# Patient Record
Sex: FEMALE | Race: WHITE | NOT HISPANIC OR LATINO | Employment: FULL TIME | URBAN - METROPOLITAN AREA
[De-identification: names, ages, dates, MRNs, and addresses within clinical notes are randomized per-mention and may not be internally consistent; named-entity substitution may affect disease eponyms.]

---

## 2023-12-14 DIAGNOSIS — C18.9 MALIGNANT NEOPLASM OF COLON, UNSPECIFIED PART OF COLON (MULTI): ICD-10-CM

## 2023-12-21 ENCOUNTER — LAB (OUTPATIENT)
Dept: LAB | Facility: HOSPITAL | Age: 53
End: 2023-12-21
Payer: COMMERCIAL

## 2023-12-21 DIAGNOSIS — C18.9 MALIGNANT NEOPLASM OF COLON, UNSPECIFIED PART OF COLON (MULTI): ICD-10-CM

## 2023-12-21 DIAGNOSIS — C18.7 MALIGNANT NEOPLASM OF SIGMOID COLON (MULTI): ICD-10-CM

## 2023-12-21 LAB — CEA SERPL-MCNC: <0.5 UG/L

## 2023-12-21 PROCEDURE — 36415 COLL VENOUS BLD VENIPUNCTURE: CPT

## 2023-12-21 PROCEDURE — 36416 COLLJ CAPILLARY BLOOD SPEC: CPT

## 2023-12-21 PROCEDURE — 82378 CARCINOEMBRYONIC ANTIGEN: CPT

## 2023-12-29 LAB — SCAN RESULT: NORMAL

## 2024-01-18 ENCOUNTER — OFFICE VISIT (OUTPATIENT)
Dept: GASTROENTEROLOGY | Facility: CLINIC | Age: 54
End: 2024-01-18
Payer: COMMERCIAL

## 2024-01-18 VITALS
HEIGHT: 68 IN | DIASTOLIC BLOOD PRESSURE: 57 MMHG | SYSTOLIC BLOOD PRESSURE: 87 MMHG | HEART RATE: 73 BPM | WEIGHT: 293 LBS | BODY MASS INDEX: 44.41 KG/M2

## 2024-01-18 DIAGNOSIS — C18.7 CANCER OF SIGMOID COLON (MULTI): Primary | ICD-10-CM

## 2024-01-18 DIAGNOSIS — K76.0 HEPATIC STEATOSIS: ICD-10-CM

## 2024-01-18 PROCEDURE — 99213 OFFICE O/P EST LOW 20 MIN: CPT | Performed by: INTERNAL MEDICINE

## 2024-01-18 RX ORDER — POLYETHYLENE GLYCOL 3350, SODIUM SULFATE ANHYDROUS, SODIUM BICARBONATE, SODIUM CHLORIDE, POTASSIUM CHLORIDE 236; 22.74; 6.74; 5.86; 2.97 G/4L; G/4L; G/4L; G/4L; G/4L
4000 POWDER, FOR SOLUTION ORAL ONCE
Qty: 4000 ML | Refills: 0 | Status: SHIPPED | OUTPATIENT
Start: 2024-01-18 | End: 2024-01-18

## 2024-01-18 RX ORDER — MULTIVITAMIN WITH IRON
1 TABLET ORAL DAILY
COMMUNITY
Start: 2021-01-21

## 2024-01-18 RX ORDER — CHOLECALCIFEROL (VITAMIN D3) 125 MCG
1 CAPSULE ORAL DAILY
COMMUNITY
Start: 2021-01-21 | End: 2024-04-25 | Stop reason: ALTCHOICE

## 2024-01-18 RX ORDER — ONDANSETRON 4 MG/1
4 TABLET, FILM COATED ORAL EVERY 8 HOURS PRN
Qty: 9 TABLET | Refills: 0 | Status: SHIPPED | OUTPATIENT
Start: 2024-01-18 | End: 2024-04-25 | Stop reason: ALTCHOICE

## 2024-01-18 NOTE — PROGRESS NOTES
Chief Complaint: Latasha Rodrigues is a 53 y.o. female who presents for Colon Cancer Screening (Needs every 2 years and is due).  HPI  This is a 53-year-old female,  new employee, history of hypertension, S/P cholecystectomy, morbid obesity, hepatic steatosis, obesity, sigmoid cancer diagnosed on 10/5/2020 S/P sigmoid resection with end-to-end anastomosis, came to GI clinic for establish care.  Colonoscopy by Dr. Chas Oliveira on 10/5/2020-found sigmoid mass at 25 cm from anal verge.  Follow-up colonoscopy via ostomy by Dr. Nguyen on 2/8/2021-5 mm cecal polyp removed  Also had flexible sigmoidoscopy by Dr. Nguyen on 4/5/2021.  Another colonoscopy by Dr. Dee  on 12/7/2021: 4 subcentimeter polyps removed, grade 1 internal hemorrhoids, patent end-to-end colocolonic anastomosis.  Path report on colon polyp-tubular adenoma.  Currently no acute GI symptoms.  CT scan on 7/17/2023: Hepatic steatosis.  No evidence of metastasis  Review of Systems   Gastrointestinal: Negative.      12 Point ROS negative outside of symptoms stated above in HPI    Past Medical History:   Diagnosis Date    Colon cancer (CMS/HCC) Oct 2020    Colon polyp Oct 2020    Diseases of lips 12/28/2015    Cheilitis    Hypertension     Morbid (severe) obesity due to excess calories (CMS/HCC) 09/09/2019    Class 3 severe obesity due to excess calories with serious comorbidity and body mass index (BMI) of 45.0 to 49.9 in adult    Personal history of other diseases of the digestive system 05/13/2015    History of cholecystitis    Personal history of other diseases of the female genital tract 04/16/2015    History of ovarian cyst    Personal history of other specified conditions 10/16/2020    History of abdominal pain    Portal vein thrombosis 12/01/2021    Portal vein thrombosis    Right upper quadrant pain 04/16/2015    Abdominal pain, RUQ (right upper quadrant)    Unspecified open wound of abdominal wall, unspecified quadrant without penetration into peritoneal  "cavity, initial encounter 12/01/2021    Open abdominal wall wound       Past Surgical History:   Procedure Laterality Date    CHOLECYSTECTOMY  2015    COLON SURGERY  Oct 2020    COLOSTOMY  Oct 2020    OTHER SURGICAL HISTORY  06/01/2022    Cholecystectomy laparoscopic    OTHER SURGICAL HISTORY  10/07/2020    Sigmoidectomy    OTHER SURGICAL HISTORY  05/13/2015    Salpingo-oophorectomy Right Side    US GUIDED ABSCESS DRAIN  10/28/2020    US GUIDED ABSCESS DRAIN 10/28/2020 [facility]       No relevant family history has been documented for this patient.     reports that she has quit smoking. She has never used smokeless tobacco. She reports current alcohol use of about 5.0 standard drinks of alcohol per week. She reports that she does not use drugs.    No Known Allergies    Imaging  No results found.      Laboratory  No results found for this or any previous visit (from the past 96 hour(s)).     Objective       Current Outpatient Medications:     cholecalciferol (Vitamin D-3) 125 MCG (5000 UT) capsule, Take 1 tablet by mouth once daily., Disp: , Rfl:     multivitamin (Multiple Vitamins) tablet, Take 1 tablet by mouth once daily., Disp: , Rfl:     lisinopril 10 mg tablet 10 mg, hydroCHLOROthiazide 12.5 mg tablet 12.5 mg, Take 1 tablet by mouth once daily., Disp: , Rfl:     Last Recorded Vitals  Blood pressure 87/57, pulse 73, height 1.727 m (5' 8\"), weight 144 kg (318 lb).    Physical Exam  HENT:      Mouth/Throat:      Mouth: Mucous membranes are moist.   Cardiovascular:      Rate and Rhythm: Normal rate and regular rhythm.   Pulmonary:      Effort: Pulmonary effort is normal.      Breath sounds: Normal breath sounds.   Abdominal:      General: Bowel sounds are normal.      Palpations: Abdomen is soft.      Tenderness: There is no abdominal tenderness.      Comments: Abdominal obesity, limited exam   Musculoskeletal:      Right lower leg: Edema present.      Left lower leg: Edema present.      Comments: Nonpitting "   Neurological:      Mental Status: She is alert and oriented to person, place, and time.         Assessment/Plan    Stage IIb sigmoid cancer S/P resection in 2020.  Last colonoscopy in 2021.  Tubular adenoma  Hepatic steatosis      Surveillance colonoscopy.  FibroScan.  Monitor LFTs  Hepatitis B surface antigen, hepatitis C, hepatitis A total antibody, hepatitis B surface antibody if not checked in the past.  Avoid alcohol.  Dietary and lifestyle modification.  May discuss with bariatric team.  Follow-up with me in 6 months.

## 2024-01-18 NOTE — PATIENT INSTRUCTIONS
Surveillance colonoscopy.  FibroScan.  Monitor LFTs  Hepatitis B surface antigen, hepatitis C, hepatitis A total antibody, hepatitis B surface antibody if not checked in the past.  Avoid alcohol.  Dietary and lifestyle modification.  May discuss with bariatric team.  Follow-up with me in 6 months.

## 2024-01-28 ASSESSMENT — ENCOUNTER SYMPTOMS: GASTROINTESTINAL NEGATIVE: 1

## 2024-02-03 DIAGNOSIS — I10 ESSENTIAL (PRIMARY) HYPERTENSION: ICD-10-CM

## 2024-02-03 RX ORDER — LISINOPRIL AND HYDROCHLOROTHIAZIDE 10; 12.5 MG/1; MG/1
1 TABLET ORAL DAILY
Qty: 90 TABLET | Refills: 0 | Status: SHIPPED | OUTPATIENT
Start: 2024-02-03 | End: 2024-03-20 | Stop reason: ALTCHOICE

## 2024-02-09 ENCOUNTER — CLINICAL SUPPORT (OUTPATIENT)
Dept: GASTROENTEROLOGY | Facility: CLINIC | Age: 54
End: 2024-02-09
Payer: COMMERCIAL

## 2024-02-09 DIAGNOSIS — K76.0 HEPATIC STEATOSIS: ICD-10-CM

## 2024-02-09 PROCEDURE — 91200 LIVER ELASTOGRAPHY: CPT | Performed by: INTERNAL MEDICINE

## 2024-02-27 ENCOUNTER — APPOINTMENT (OUTPATIENT)
Dept: PRIMARY CARE | Facility: CLINIC | Age: 54
End: 2024-02-27
Payer: COMMERCIAL

## 2024-03-04 DIAGNOSIS — C18.7 MALIGNANT NEOPLASM OF SIGMOID COLON (MULTI): ICD-10-CM

## 2024-03-15 ENCOUNTER — LAB (OUTPATIENT)
Dept: LAB | Facility: HOSPITAL | Age: 54
End: 2024-03-15
Payer: COMMERCIAL

## 2024-03-15 DIAGNOSIS — C18.7 MALIGNANT NEOPLASM OF SIGMOID COLON (MULTI): ICD-10-CM

## 2024-03-15 PROCEDURE — 36415 COLL VENOUS BLD VENIPUNCTURE: CPT

## 2024-03-19 ENCOUNTER — ANESTHESIA EVENT (OUTPATIENT)
Dept: OPERATING ROOM | Facility: HOSPITAL | Age: 54
End: 2024-03-19
Payer: COMMERCIAL

## 2024-03-19 RX ORDER — ONDANSETRON HYDROCHLORIDE 2 MG/ML
8 INJECTION, SOLUTION INTRAVENOUS ONCE
Status: CANCELLED | OUTPATIENT
Start: 2024-03-19 | End: 2024-03-19

## 2024-03-19 RX ORDER — ACETAMINOPHEN 325 MG/1
650 TABLET ORAL EVERY 4 HOURS PRN
Status: CANCELLED | OUTPATIENT
Start: 2024-03-19

## 2024-03-19 RX ORDER — ALBUTEROL SULFATE 0.83 MG/ML
2.5 SOLUTION RESPIRATORY (INHALATION) ONCE AS NEEDED
Status: CANCELLED | OUTPATIENT
Start: 2024-03-19

## 2024-03-20 ENCOUNTER — ANESTHESIA (OUTPATIENT)
Dept: OPERATING ROOM | Facility: HOSPITAL | Age: 54
End: 2024-03-20
Payer: COMMERCIAL

## 2024-03-20 ENCOUNTER — HOSPITAL ENCOUNTER (OUTPATIENT)
Dept: OPERATING ROOM | Facility: HOSPITAL | Age: 54
Setting detail: OUTPATIENT SURGERY
Discharge: HOME | End: 2024-03-20
Payer: COMMERCIAL

## 2024-03-20 VITALS
RESPIRATION RATE: 16 BRPM | OXYGEN SATURATION: 97 % | WEIGHT: 293 LBS | HEART RATE: 72 BPM | DIASTOLIC BLOOD PRESSURE: 67 MMHG | HEIGHT: 68 IN | SYSTOLIC BLOOD PRESSURE: 134 MMHG | TEMPERATURE: 97.9 F | BODY MASS INDEX: 44.41 KG/M2

## 2024-03-20 DIAGNOSIS — K76.0 HEPATIC STEATOSIS: ICD-10-CM

## 2024-03-20 DIAGNOSIS — C18.7 CANCER OF SIGMOID COLON (MULTI): ICD-10-CM

## 2024-03-20 PROBLEM — I10 HTN (HYPERTENSION): Status: ACTIVE | Noted: 2024-03-20

## 2024-03-20 PROBLEM — E66.9 OBESITY: Status: ACTIVE | Noted: 2024-03-20

## 2024-03-20 LAB — PREGNANCY TEST URINE, POC: NEGATIVE

## 2024-03-20 PROCEDURE — 2500000004 HC RX 250 GENERAL PHARMACY W/ HCPCS (ALT 636 FOR OP/ED): Performed by: ANESTHESIOLOGY

## 2024-03-20 PROCEDURE — 88305 TISSUE EXAM BY PATHOLOGIST: CPT | Mod: TC,GEALAB | Performed by: INTERNAL MEDICINE

## 2024-03-20 PROCEDURE — 3700000001 HC GENERAL ANESTHESIA TIME - INITIAL BASE CHARGE: Performed by: REGISTERED NURSE

## 2024-03-20 PROCEDURE — 45385 COLONOSCOPY W/LESION REMOVAL: CPT | Performed by: INTERNAL MEDICINE

## 2024-03-20 PROCEDURE — 2500000004 HC RX 250 GENERAL PHARMACY W/ HCPCS (ALT 636 FOR OP/ED): Performed by: REGISTERED NURSE

## 2024-03-20 PROCEDURE — 3600000002 HC OR TIME - INITIAL BASE CHARGE - PROCEDURE LEVEL TWO: Performed by: REGISTERED NURSE

## 2024-03-20 PROCEDURE — 3700000002 HC GENERAL ANESTHESIA TIME - EACH INCREMENTAL 1 MINUTE: Performed by: REGISTERED NURSE

## 2024-03-20 PROCEDURE — A45385 PR COLONOSCOPY,REMV LESN,SNARE: Performed by: REGISTERED NURSE

## 2024-03-20 PROCEDURE — 7100000009 HC PHASE TWO TIME - INITIAL BASE CHARGE: Performed by: REGISTERED NURSE

## 2024-03-20 PROCEDURE — 7100000010 HC PHASE TWO TIME - EACH INCREMENTAL 1 MINUTE: Performed by: REGISTERED NURSE

## 2024-03-20 PROCEDURE — 88305 TISSUE EXAM BY PATHOLOGIST: CPT | Performed by: PATHOLOGY

## 2024-03-20 PROCEDURE — 81025 URINE PREGNANCY TEST: CPT | Performed by: INTERNAL MEDICINE

## 2024-03-20 PROCEDURE — 3600000007 HC OR TIME - EACH INCREMENTAL 1 MINUTE - PROCEDURE LEVEL TWO: Performed by: REGISTERED NURSE

## 2024-03-20 RX ORDER — PROPOFOL 10 MG/ML
INJECTION, EMULSION INTRAVENOUS CONTINUOUS PRN
Status: DISCONTINUED | OUTPATIENT
Start: 2024-03-20 | End: 2024-03-20

## 2024-03-20 RX ORDER — PROPOFOL 10 MG/ML
INJECTION, EMULSION INTRAVENOUS AS NEEDED
Status: DISCONTINUED | OUTPATIENT
Start: 2024-03-20 | End: 2024-03-20

## 2024-03-20 RX ORDER — SODIUM CHLORIDE, SODIUM LACTATE, POTASSIUM CHLORIDE, CALCIUM CHLORIDE 600; 310; 30; 20 MG/100ML; MG/100ML; MG/100ML; MG/100ML
100 INJECTION, SOLUTION INTRAVENOUS CONTINUOUS
Status: DISCONTINUED | OUTPATIENT
Start: 2024-03-20 | End: 2024-03-21 | Stop reason: HOSPADM

## 2024-03-20 RX ORDER — MIDAZOLAM HYDROCHLORIDE 1 MG/ML
INJECTION INTRAMUSCULAR; INTRAVENOUS AS NEEDED
Status: DISCONTINUED | OUTPATIENT
Start: 2024-03-20 | End: 2024-03-20

## 2024-03-20 RX ADMIN — PROPOFOL 80 MG: 10 INJECTION, EMULSION INTRAVENOUS at 12:52

## 2024-03-20 RX ADMIN — MIDAZOLAM HYDROCHLORIDE 2 MG: 1 INJECTION, SOLUTION INTRAMUSCULAR; INTRAVENOUS at 12:49

## 2024-03-20 RX ADMIN — PROPOFOL 300 MCG/KG/MIN: 10 INJECTION, EMULSION INTRAVENOUS at 12:52

## 2024-03-20 RX ADMIN — SODIUM CHLORIDE, POTASSIUM CHLORIDE, SODIUM LACTATE AND CALCIUM CHLORIDE 100 ML/HR: 600; 310; 30; 20 INJECTION, SOLUTION INTRAVENOUS at 12:12

## 2024-03-20 SDOH — HEALTH STABILITY: MENTAL HEALTH: CURRENT SMOKER: 0

## 2024-03-20 ASSESSMENT — COLUMBIA-SUICIDE SEVERITY RATING SCALE - C-SSRS
6. HAVE YOU EVER DONE ANYTHING, STARTED TO DO ANYTHING, OR PREPARED TO DO ANYTHING TO END YOUR LIFE?: NO
1. IN THE PAST MONTH, HAVE YOU WISHED YOU WERE DEAD OR WISHED YOU COULD GO TO SLEEP AND NOT WAKE UP?: NO
2. HAVE YOU ACTUALLY HAD ANY THOUGHTS OF KILLING YOURSELF?: NO

## 2024-03-20 NOTE — ANESTHESIA POSTPROCEDURE EVALUATION
Patient: Latasha Rodrigues    Procedure Summary       Date: 03/20/24 Room / Location: [facility] OR    Anesthesia Start: 1247 Anesthesia Stop: 1324    Procedure: COLONOSCOPY Diagnosis:       Cancer of sigmoid colon (CMS/HCC)      Hepatic steatosis    Scheduled Providers: Lev Valencia MD Responsible Provider: NAZIA Barrera    Anesthesia Type: MAC ASA Status: 3            Anesthesia Type: MAC    Vitals Value Taken Time   /67 03/20/24 1339   Temp 36.6 °C (97.9 °F) 03/20/24 1320   Pulse 72 03/20/24 1339   Resp 16 03/20/24 1339   SpO2 97 % 03/20/24 1339       Anesthesia Post Evaluation    Patient location during evaluation: PACU  Patient participation: complete - patient participated  Level of consciousness: awake and alert  Pain management: adequate  Airway patency: patent  Cardiovascular status: acceptable and blood pressure returned to baseline  Respiratory status: acceptable  Hydration status: acceptable  Postoperative Nausea and Vomiting: none        There were no known notable events for this encounter.

## 2024-03-20 NOTE — H&P
History Of Present Illness  Latasha Rodrigues is a 53 y.o. female presenting to GI lab for colonoscopy     Past Medical History  Past Medical History:   Diagnosis Date    Cancer of sigmoid colon (CMS/HCC)     Class 3 severe obesity with body mass index (BMI) of 45.0 to 49.9 in adult (CMS/HCC) 01/18/2024    48.35 kg/m²    Colon cancer (CMS/HCC) Oct 2020    Colon polyp Oct 2020    Diseases of lips 12/28/2015    Cheilitis    Hepatic steatosis     Hypertension     Morbid (severe) obesity due to excess calories (CMS/HCC) 09/09/2019    Class 3 severe obesity due to excess calories with serious comorbidity and body mass index (BMI) of 45.0 to 49.9 in adult    Personal history of other diseases of the digestive system 05/13/2015    History of cholecystitis    Personal history of other diseases of the female genital tract 04/16/2015    History of ovarian cyst    Personal history of other specified conditions 10/16/2020    History of abdominal pain    Portal vein thrombosis 12/01/2021    Portal vein thrombosis    Right upper quadrant pain 04/16/2015    Abdominal pain, RUQ (right upper quadrant)    Unspecified open wound of abdominal wall, unspecified quadrant without penetration into peritoneal cavity, initial encounter 12/01/2021    Open abdominal wall wound       Surgical History  Past Surgical History:   Procedure Laterality Date    CHOLECYSTECTOMY  2015    COLON SURGERY  Oct 2020    COLOSTOMY  Oct 2020    OTHER SURGICAL HISTORY  06/01/2022    Cholecystectomy laparoscopic    OTHER SURGICAL HISTORY  10/07/2020    Sigmoidectomy    OTHER SURGICAL HISTORY  05/13/2015    Salpingo-oophorectomy Right Side    US GUIDED ABSCESS DRAIN  10/28/2020    US GUIDED ABSCESS DRAIN 10/28/2020 [facility]        Social History  She reports that she has quit smoking. She has never used smokeless tobacco. She reports current alcohol use of about 5.0 standard drinks of alcohol per week. She reports that she does not use drugs.    Family  "History  Family History   Problem Relation Name Age of Onset    Other (carcinoma) Mother      Liver disease Father Don     Breast cancer Mother's Sister      Colon cancer Maternal Grandmother Raquel         Allergies  Patient has no known allergies.    Review of Systems     Physical Exam  Cardiovascular:      Rate and Rhythm: Normal rate and regular rhythm.   Pulmonary:      Effort: Pulmonary effort is normal.      Breath sounds: Normal breath sounds.   Neurological:      Mental Status: She is alert and oriented to person, place, and time.          Last Recorded Vitals  Blood pressure (!) 161/93, pulse 80, temperature 36 °C (96.8 °F), resp. rate 16, height 1.727 m (5' 8\"), weight 144 kg (317 lb 0.3 oz), SpO2 96 %.    Relevant Results             Assessment/Plan   Active Problems:  There are no active Hospital Problems.             History of colon cancer  History of colon polyp    Surveillance colonoscopy      Lev Valencia MD    "

## 2024-03-20 NOTE — ANESTHESIA PREPROCEDURE EVALUATION
Patient: Latasha Rodrigues    Procedure Information       Date/Time: 03/20/24 1300    Scheduled providers: Lev Valencia MD    Procedure: COLONOSCOPY    Location: [facility] OR            Relevant Problems   Anesthesia (within normal limits)      Cardiovascular   (+) HTN (hypertension)      Endocrine   (+) Obesity      GI  Colon Cancer s/p colostomy, chemo      /Renal (within normal limits)      Neuro/Psych (within normal limits)      Pulmonary (within normal limits)      GI/Hepatic (within normal limits)      Hematology  H/o DVT      Musculoskeletal (within normal limits)      Eyes, Ears, Nose, and Throat (within normal limits)     Vitals:    03/20/24 1143   BP: (!) 161/93   Pulse: 80   Resp: 16   Temp: 36 °C (96.8 °F)   SpO2: 96%       Past Surgical History:   Procedure Laterality Date    CHOLECYSTECTOMY  2015    COLON SURGERY  Oct 2020    COLOSTOMY  Oct 2020    OTHER SURGICAL HISTORY  06/01/2022    Cholecystectomy laparoscopic    OTHER SURGICAL HISTORY  10/07/2020    Sigmoidectomy    OTHER SURGICAL HISTORY  05/13/2015    Salpingo-oophorectomy Right Side    US GUIDED ABSCESS DRAIN  10/28/2020    US GUIDED ABSCESS DRAIN 10/28/2020 [facility]     Past Medical History:   Diagnosis Date    Cancer of sigmoid colon (CMS/HCC)     Class 3 severe obesity with body mass index (BMI) of 45.0 to 49.9 in adult (CMS/HCC) 01/18/2024    48.35 kg/m²    Colon cancer (CMS/HCC) Oct 2020    Colon polyp Oct 2020    Diseases of lips 12/28/2015    Cheilitis    Hepatic steatosis     Hypertension     Morbid (severe) obesity due to excess calories (CMS/HCC) 09/09/2019    Class 3 severe obesity due to excess calories with serious comorbidity and body mass index (BMI) of 45.0 to 49.9 in adult    Personal history of other diseases of the digestive system 05/13/2015    History of cholecystitis    Personal history of other diseases of the female genital tract 04/16/2015    History of ovarian cyst    Personal history of other  specified conditions 10/16/2020    History of abdominal pain    Portal vein thrombosis 12/01/2021    Portal vein thrombosis    Right upper quadrant pain 04/16/2015    Abdominal pain, RUQ (right upper quadrant)    Unspecified open wound of abdominal wall, unspecified quadrant without penetration into peritoneal cavity, initial encounter 12/01/2021    Open abdominal wall wound       Current Outpatient Medications:     cholecalciferol (Vitamin D-3) 125 MCG (5000 UT) capsule, Take 1 tablet by mouth once daily., Disp: , Rfl:     lisinopril 10 mg tablet 10 mg, hydroCHLOROthiazide 12.5 mg tablet 12.5 mg, Take 1 tablet by mouth once daily., Disp: , Rfl:     ondansetron (Zofran) 4 mg tablet, Take 1 tablet (4 mg) by mouth every 8 hours if needed for nausea or vomiting for up to 9 doses., Disp: 9 tablet, Rfl: 0    multivitamin (Multiple Vitamins) tablet, Take 1 tablet by mouth once daily., Disp: , Rfl:     Current Facility-Administered Medications:     lactated Ringer's infusion, 100 mL/hr, intravenous, Continuous, Phuc Wellington MD, Last Rate: 100 mL/hr at 03/20/24 1212, 100 mL/hr at 03/20/24 1212  Prior to Admission medications    Medication Sig Start Date End Date Taking? Authorizing Provider   cholecalciferol (Vitamin D-3) 125 MCG (5000 UT) capsule Take 1 tablet by mouth once daily. 1/21/21  Yes Historical Provider, MD   lisinopril 10 mg tablet 10 mg, hydroCHLOROthiazide 12.5 mg tablet 12.5 mg Take 1 tablet by mouth once daily.   Yes Historical Provider, MD   ondansetron (Zofran) 4 mg tablet Take 1 tablet (4 mg) by mouth every 8 hours if needed for nausea or vomiting for up to 9 doses. 1/18/24  Yes Lev Valencia MD   multivitamin (Multiple Vitamins) tablet Take 1 tablet by mouth once daily. 1/21/21   Historical Provider, MD   lisinopriL-hydrochlorothiazide 10-12.5 mg tablet Take 1 tablet by mouth once daily. PLEASE CALL OFFICE FOR APPT 2/3/24 3/20/24  Joseline Salas MD     No Known Allergies  Social  History     Tobacco Use    Smoking status: Former    Smokeless tobacco: Never   Substance Use Topics    Alcohol use: Yes     Alcohol/week: 5.0 standard drinks of alcohol     Types: 5 Standard drinks or equivalent per week         Chemistry    Lab Results   Component Value Date/Time     07/14/2023 0804    K 4.1 07/14/2023 0804     07/14/2023 0804    CO2 26 07/14/2023 0804    BUN 17 07/14/2023 0804    CREATININE 0.86 07/14/2023 0804    Lab Results   Component Value Date/Time    CALCIUM 9.3 07/14/2023 0804    ALKPHOS 55 07/14/2023 0804    AST 19 07/14/2023 0804    ALT 24 07/14/2023 0804    BILITOT 0.9 07/14/2023 0804          Lab Results   Component Value Date/Time    WBC 8.4 07/14/2023 0804    HGB 14.4 07/14/2023 0804    HCT 43.9 07/14/2023 0804     07/14/2023 0804     Lab Results   Component Value Date/Time    PROTIME 12.4 05/31/2022 1349    INR 1.1 05/31/2022 1349     Clinical information reviewed:   Tobacco  Allergies  Meds   Med Hx  Surg Hx  OB Status  Fam Hx  Soc   Hx        NPO Detail:  NPO/Void Status  Date of Last Liquid: 03/19/24  Date of Last Solid: 03/19/24         Physical Exam    Airway  Mallampati: III  TM distance: >3 FB  Neck ROM: full     Cardiovascular   Rhythm: regular     Dental        Pulmonary   Breath sounds clear to auscultation     Abdominal            Anesthesia Plan    History of general anesthesia?: yes  History of complications of general anesthesia?: no    ASA 3     MAC     The patient is not a current smoker.    Anesthetic plan and risks discussed with patient.  Use of blood products discussed with patient who consented to blood products.    Plan discussed with CRNA.

## 2024-03-20 NOTE — DISCHARGE INSTRUCTIONS
Patient Instructions after a Colonoscopy      The anesthetics, sedatives or narcotics which were given to you today will be acting in your body for the next 24 hours, so you might feel a little sleepy or groggy.  This feeling should slowly wear off. Carefully read and follow the instructions.     You received sedation today:  - Do not drive or operate any machinery or power tools of any kind.   - No alcoholic beverages today, not even beer or wine.  - Do not make any important decisions or sign any legal documents.  - No over the counter medications that contain alcohol or that may cause drowsiness.  - Do not make any important decisions or sign any legal documents.    While it is common to experience mild to moderate abdominal distention, gas, or belching after your procedure, if any of these symptoms occur following discharge from the GI Lab or within one week of having your procedure, call the [facility] to be advised whether a visit to your nearest Urgent Care or Emergency Department is indicated.  Take this paper with you if you go.     - If you develop an allergic reaction to the medications that were given during your procedure such as difficulty breathing, rash, hives, severe nausea, vomiting or lightheadedness.  - If you experience chest pain, shortness of breath, severe abdominal pain, fevers and chills.  -If you develop signs and symptoms of bleeding such as blood in your spit, if your stools turn black, tarry, or bloody  - If you have not urinated within 8 hours following your procedure.  - If your IV site becomes painful, red, inflamed, or looks infected.    If you received a biopsy/polypectomy/sphincterotomy the following instructions apply below:    __ Do not use Aspirin containing products, non-steroidal medications or anti-coagulants for one week following your procedure. (Examples of these types of medications are: Advil, Arthrotec, Aleve, Coumadin, Ecotrin, Heparin, Ibuprofen,  Indocin, Motrin, Naprosyn, Nuprin, Plavix, Vioxx, and Voltarin, or their generic forms.  This list is not all-inclusive.  Check with your physician or pharmacist before resuming medications.)   __ Eat a soft diet today.  Avoid foods that are poorly digested for the next 24 hours.  These foods would include: nuts, beans, lettuce, red meats, and fried foods. Start with liquids and advance your diet as tolerated, gradually work up to eating solids.   __ Do not have a Barium Study or Enema for one week.    Your physician recommends the additional following instructions:    -You have a contact number available for emergencies. The signs and symptoms of potential delayed complications were discussed with you. You may return to normal activities tomorrow.  -Resume your previous diet.  -Continue your present medications.   -We are waiting for your pathology results.  -Your physician has recommended a repeat colonoscopy (date to be determined after pending pathology results are reviewed) for surveillance based on pathology results.  -The findings and recommendations have been discussed with you.  -The findings and recommendations were discussed with your family.  - Please see Medication Reconciliation Form for new medication/medications prescribed.       If you experience any problems or have any questions following discharge from the GI Lab, please call:        Nurse Signature                                                                        Date___________________                                                                            Patient/Responsible Party Signature                                        Date___________________

## 2024-03-25 LAB
LABORATORY COMMENT REPORT: NORMAL
PATH REPORT.FINAL DX SPEC: NORMAL
PATH REPORT.GROSS SPEC: NORMAL
PATH REPORT.TOTAL CANCER: NORMAL

## 2024-03-29 LAB — SCAN RESULT: NORMAL

## 2024-04-03 PROBLEM — K59.00 CONSTIPATION: Status: RESOLVED | Noted: 2024-04-03 | Resolved: 2024-04-03

## 2024-04-03 PROBLEM — J30.9 ALLERGIC RHINITIS: Status: RESOLVED | Noted: 2024-04-03 | Resolved: 2024-04-03

## 2024-04-03 PROBLEM — R10.9 LEFT SIDED ABDOMINAL PAIN: Status: RESOLVED | Noted: 2024-04-03 | Resolved: 2024-04-03

## 2024-04-03 PROBLEM — Z15.89 MONOALLELIC MUTATION OF MUTYH GENE: Status: ACTIVE | Noted: 2024-04-03

## 2024-04-03 PROBLEM — M25.569 CHRONIC KNEE PAIN: Status: ACTIVE | Noted: 2024-04-03

## 2024-04-03 PROBLEM — I10 BENIGN ESSENTIAL HYPERTENSION: Status: ACTIVE | Noted: 2024-04-03

## 2024-04-03 PROBLEM — G89.29 CHRONIC KNEE PAIN: Status: ACTIVE | Noted: 2024-04-03

## 2024-04-03 PROBLEM — N39.0 ACUTE URINARY TRACT INFECTION: Status: RESOLVED | Noted: 2024-04-03 | Resolved: 2024-04-03

## 2024-04-03 PROBLEM — C18.9 COLON CANCER (MULTI): Status: ACTIVE | Noted: 2024-04-03

## 2024-04-03 RX ORDER — CAPECITABINE 500 MG/1
TABLET, FILM COATED ORAL
COMMUNITY
Start: 2021-01-21 | End: 2024-04-25 | Stop reason: ALTCHOICE

## 2024-04-03 RX ORDER — LISINOPRIL AND HYDROCHLOROTHIAZIDE 10; 12.5 MG/1; MG/1
1 TABLET ORAL DAILY
COMMUNITY
Start: 2019-02-11 | End: 2024-04-25 | Stop reason: SDUPTHER

## 2024-04-23 ASSESSMENT — ENCOUNTER SYMPTOMS
ORTHOPNEA: 0
SHORTNESS OF BREATH: 0
PALPITATIONS: 0
HEADACHES: 0
HYPERTENSION: 1
NECK PAIN: 0
SWEATS: 0
PND: 0
BLURRED VISION: 0

## 2024-04-25 ENCOUNTER — OFFICE VISIT (OUTPATIENT)
Dept: PRIMARY CARE | Facility: CLINIC | Age: 54
End: 2024-04-25
Payer: COMMERCIAL

## 2024-04-25 VITALS
DIASTOLIC BLOOD PRESSURE: 86 MMHG | WEIGHT: 293 LBS | OXYGEN SATURATION: 97 % | BODY MASS INDEX: 48.2 KG/M2 | SYSTOLIC BLOOD PRESSURE: 130 MMHG | HEART RATE: 79 BPM

## 2024-04-25 DIAGNOSIS — R73.03 PREDIABETES: ICD-10-CM

## 2024-04-25 DIAGNOSIS — N91.2 AMENORRHEA: ICD-10-CM

## 2024-04-25 DIAGNOSIS — E66.01 MORBID OBESITY WITH BMI OF 45.0-49.9, ADULT (MULTI): ICD-10-CM

## 2024-04-25 DIAGNOSIS — I10 BENIGN ESSENTIAL HYPERTENSION: Primary | ICD-10-CM

## 2024-04-25 LAB
ALBUMIN SERPL BCP-MCNC: 4.3 G/DL (ref 3.4–5)
ALP SERPL-CCNC: 61 U/L (ref 33–110)
ALT SERPL W P-5'-P-CCNC: 27 U/L (ref 7–45)
ANION GAP SERPL CALC-SCNC: 12 MMOL/L (ref 10–20)
AST SERPL W P-5'-P-CCNC: 19 U/L (ref 9–39)
BASOPHILS # BLD AUTO: 0.05 X10*3/UL (ref 0–0.1)
BASOPHILS NFR BLD AUTO: 0.6 %
BILIRUB SERPL-MCNC: 0.7 MG/DL (ref 0–1.2)
BUN SERPL-MCNC: 15 MG/DL (ref 6–23)
CALCIUM SERPL-MCNC: 9.4 MG/DL (ref 8.6–10.3)
CHLORIDE SERPL-SCNC: 103 MMOL/L (ref 98–107)
CHOLEST SERPL-MCNC: 182 MG/DL (ref 0–199)
CHOLESTEROL/HDL RATIO: 3.8
CO2 SERPL-SCNC: 27 MMOL/L (ref 21–32)
CREAT SERPL-MCNC: 0.74 MG/DL (ref 0.5–1.05)
EGFRCR SERPLBLD CKD-EPI 2021: >90 ML/MIN/1.73M*2
EOSINOPHIL # BLD AUTO: 0.1 X10*3/UL (ref 0–0.7)
EOSINOPHIL NFR BLD AUTO: 1.2 %
ERYTHROCYTE [DISTWIDTH] IN BLOOD BY AUTOMATED COUNT: 13.3 % (ref 11.5–14.5)
EST. AVERAGE GLUCOSE BLD GHB EST-MCNC: 120 MG/DL
FSH SERPL-ACNC: 18.4 IU/L
GLUCOSE SERPL-MCNC: 100 MG/DL (ref 74–99)
HBA1C MFR BLD: 5.8 %
HCT VFR BLD AUTO: 44.6 % (ref 36–46)
HDLC SERPL-MCNC: 47.6 MG/DL
HGB BLD-MCNC: 14.6 G/DL (ref 12–16)
IMM GRANULOCYTES # BLD AUTO: 0.02 X10*3/UL (ref 0–0.7)
IMM GRANULOCYTES NFR BLD AUTO: 0.2 % (ref 0–0.9)
LDLC SERPL CALC-MCNC: 111 MG/DL
LYMPHOCYTES # BLD AUTO: 2.21 X10*3/UL (ref 1.2–4.8)
LYMPHOCYTES NFR BLD AUTO: 27.4 %
MCH RBC QN AUTO: 30.7 PG (ref 26–34)
MCHC RBC AUTO-ENTMCNC: 32.7 G/DL (ref 32–36)
MCV RBC AUTO: 94 FL (ref 80–100)
MONOCYTES # BLD AUTO: 0.53 X10*3/UL (ref 0.1–1)
MONOCYTES NFR BLD AUTO: 6.6 %
NEUTROPHILS # BLD AUTO: 5.15 X10*3/UL (ref 1.2–7.7)
NEUTROPHILS NFR BLD AUTO: 64 %
NON HDL CHOLESTEROL: 134 MG/DL (ref 0–149)
NRBC BLD-RTO: 0 /100 WBCS (ref 0–0)
PLATELET # BLD AUTO: 235 X10*3/UL (ref 150–450)
POTASSIUM SERPL-SCNC: 4.4 MMOL/L (ref 3.5–5.3)
PROT SERPL-MCNC: 7 G/DL (ref 6.4–8.2)
RBC # BLD AUTO: 4.76 X10*6/UL (ref 4–5.2)
SODIUM SERPL-SCNC: 138 MMOL/L (ref 136–145)
TRIGL SERPL-MCNC: 117 MG/DL (ref 0–149)
TSH SERPL-ACNC: 1.43 MIU/L (ref 0.44–3.98)
VLDL: 23 MG/DL (ref 0–40)
WBC # BLD AUTO: 8.1 X10*3/UL (ref 4.4–11.3)

## 2024-04-25 PROCEDURE — 99214 OFFICE O/P EST MOD 30 MIN: CPT | Performed by: FAMILY MEDICINE

## 2024-04-25 PROCEDURE — 3079F DIAST BP 80-89 MM HG: CPT | Performed by: FAMILY MEDICINE

## 2024-04-25 PROCEDURE — 36415 COLL VENOUS BLD VENIPUNCTURE: CPT

## 2024-04-25 PROCEDURE — 83036 HEMOGLOBIN GLYCOSYLATED A1C: CPT

## 2024-04-25 PROCEDURE — 80061 LIPID PANEL: CPT

## 2024-04-25 PROCEDURE — 3008F BODY MASS INDEX DOCD: CPT | Performed by: FAMILY MEDICINE

## 2024-04-25 PROCEDURE — 80053 COMPREHEN METABOLIC PANEL: CPT

## 2024-04-25 PROCEDURE — 83001 ASSAY OF GONADOTROPIN (FSH): CPT

## 2024-04-25 PROCEDURE — 84443 ASSAY THYROID STIM HORMONE: CPT

## 2024-04-25 PROCEDURE — 85025 COMPLETE CBC W/AUTO DIFF WBC: CPT

## 2024-04-25 PROCEDURE — 3075F SYST BP GE 130 - 139MM HG: CPT | Performed by: FAMILY MEDICINE

## 2024-04-25 PROCEDURE — 1036F TOBACCO NON-USER: CPT | Performed by: FAMILY MEDICINE

## 2024-04-25 RX ORDER — LISINOPRIL AND HYDROCHLOROTHIAZIDE 10; 12.5 MG/1; MG/1
1 TABLET ORAL DAILY
Qty: 90 TABLET | Refills: 3 | Status: SHIPPED | OUTPATIENT
Start: 2024-04-25

## 2024-04-25 ASSESSMENT — ENCOUNTER SYMPTOMS
PALPITATIONS: 0
ORTHOPNEA: 0
PND: 0
HEADACHES: 0
SHORTNESS OF BREATH: 0
NECK PAIN: 0
HYPERTENSION: 1
SWEATS: 0
BLURRED VISION: 0

## 2024-04-25 ASSESSMENT — VISUAL ACUITY: OU: 1

## 2024-04-25 NOTE — PROGRESS NOTES
4  Subjective   Patient ID: Latasha Rodrigues is a 53 y.o. female who presents for Follow-up (Refills.).    Hypertension  This is a chronic problem. The current episode started more than 1 year ago. The problem has been gradually improving since onset. The problem is controlled. Pertinent negatives include no anxiety, blurred vision, chest pain, headaches, malaise/fatigue, neck pain, orthopnea, palpitations, peripheral edema, PND, shortness of breath or sweats. There are no associated agents to hypertension. Risk factors for coronary artery disease include obesity and post-menopausal state. There are no compliance problems.       Use to see DR De La Garza  - establishing with me today       Chronic issues reviewed today  -     HTN -   On Lisinopril /hydrochlorothiazide 10-12.5 mg daily   Home BPs  118/70's usually   No side effects   Takes daily       Hx colon cancer -   Dx 10/2020 -   S/p partial  sigmoid colon resection   Had colostomy 6 mos   Reversed 2021   Had chemotherapy for 6 mos   No radiation  Sees DR Rueda every 6 mos   Last colonoscopy 3/2024       Fatty liver -    Dad  of liver failure   Liver scan 24 -   Stage 01 - 2 disease       Obesity  -   Has battled with her weight her whole life   Checked into GLP -1 agonists  -   Not covered       Nutrition - working on it   - tries to watch it ,  diabetic   Exercise - not much     Trouble staying asleep  -   Grandson visits - he gets up   Not very tired in the day       [facility] -     Has not seen a gyn in a long time   Had a ovary taken out about   Last pap about  - told it was fine for 5 years   Has an IUD   Periods stopped years ago   Mammogram - gets that yearly  -   Done 2023      Vaccines - UTD per pt   Flu - UTD  Shingrix - x 2 done  COVID -  did get 4 of them   Tdap - about        3 kids -   Oldest has a son now   [facility] in [facility]     Ambulatory oncology EMR liaison  Does not love it   Would like to get more  clinical             Review of Systems   Constitutional:  Negative for malaise/fatigue.   Eyes:  Negative for blurred vision.   Respiratory:  Negative for shortness of breath.    Cardiovascular:  Negative for chest pain, palpitations, orthopnea and PND.   Musculoskeletal:  Negative for neck pain.   Neurological:  Negative for headaches.       Objective   /86 (BP Location: Right arm, Patient Position: Sitting, BP Cuff Size: Large adult)   Pulse 79   Wt 144 kg (317 lb)   SpO2 97%   BMI 48.20 kg/m²     Physical Exam  Vitals reviewed.   Constitutional:       General: She is not in acute distress.     Appearance: Normal appearance. She is well-developed. She is obese. She is not ill-appearing, toxic-appearing or diaphoretic.   HENT:      Head: Normocephalic and atraumatic.      Right Ear: Tympanic membrane, ear canal and external ear normal. There is no impacted cerumen.      Left Ear: Tympanic membrane, ear canal and external ear normal. There is no impacted cerumen.      Nose: Nose normal. No congestion or rhinorrhea.      Mouth/Throat:      Mouth: Mucous membranes are moist.      Pharynx: Oropharynx is clear. No oropharyngeal exudate or posterior oropharyngeal erythema.      Comments: Narrow OP   Eyes:      General: Lids are normal. Vision grossly intact. Gaze aligned appropriately.         Right eye: No discharge.         Left eye: No discharge.      Extraocular Movements: Extraocular movements intact.      Conjunctiva/sclera: Conjunctivae normal.      Pupils: Pupils are equal, round, and reactive to light.   Neck:      Thyroid: No thyromegaly.      Vascular: No JVD.      Trachea: Trachea normal.   Cardiovascular:      Rate and Rhythm: Normal rate and regular rhythm.      Heart sounds: No murmur heard.     No friction rub. No gallop.   Pulmonary:      Effort: Pulmonary effort is normal. No respiratory distress.      Breath sounds: Normal breath sounds. No wheezing, rhonchi or rales.   Chest:      Chest  wall: No tenderness.   Abdominal:      General: There is no distension.      Palpations: Abdomen is soft. There is no mass.      Tenderness: There is no abdominal tenderness.   Musculoskeletal:         General: Normal range of motion.      Cervical back: Normal range of motion and neck supple. No rigidity.      Right lower leg: No edema.      Left lower leg: No edema.   Lymphadenopathy:      Cervical: No cervical adenopathy.   Skin:     General: Skin is warm and dry.      Coloration: Skin is not jaundiced.      Findings: No rash.   Neurological:      General: No focal deficit present.      Mental Status: She is alert and oriented to person, place, and time. Mental status is at baseline.      Cranial Nerves: No cranial nerve deficit.      Deep Tendon Reflexes: Reflexes normal.   Psychiatric:         Mood and Affect: Mood normal.         Behavior: Behavior normal.         Thought Content: Thought content normal.         Judgment: Judgment normal.         Assessment/Plan   Problem List Items Addressed This Visit             ICD-10-CM       High    Benign essential hypertension - Primary I10    Relevant Medications    lisinopriL-hydrochlorothiazide 10-12.5 mg tablet    Other Relevant Orders    Comprehensive Metabolic Panel    CBC and Auto Differential    Lipid Panel    TSH with reflex to Free T4 if abnormal     Other Visit Diagnoses         Codes    Prediabetes     R73.03    Relevant Orders    Hemoglobin A1C    Amenorrhea     N91.2    Relevant Orders    FSH    Morbid obesity with BMI of 45.0-49.9, adult (Multi)     E66.01, Z68.42          Generally doing well -   HTN controlled     Sees oncology regularly for hx of colon cancer     Morbid obesity - struggled with wt for years - would like to try GLP-1 agonist  Sample of ozempic given -   Can get at compounding pharmacy if she likes it     Agreed to set up WWE in 3 -4 mos     We discussed at visit any disease processes that were of concern as well as the risks, benefits  and instructions of any new medication provided.    See orders and discussion section for information handed to patient on their Clinical Summary.   Patient (and/or caretaker of patient if present)  stated all questions were answered, and they voiced understanding of instructions.

## 2024-04-25 NOTE — PATIENT INSTRUCTIONS
Set up appt for WWE in  3 -4 months     Lets have you try ozempic   0.25 mg a week for 4 weeks -   If you feel ok on it -  then 0.5 mg a week -   [facility] - message me to send in order.         Hypertension Reminders:    IF YOU ARE A PERSON WHOSE BLOOD PRESSURE RUNS HIGH IN THE DOCTOR'S OFFICE,  THEN WE NEED TO VERIFY YOUR CUFF AT LEAST  ONCE YEARLY.   ALWAYS BRING CUFF WITH YOU TO ANY HYPERTENSION CHECK UP APPOINTMENT.  WE CAN RECORD YOUR BP  FROM HOME THE DAY OF THE APPOINTMENT - BUT WE HAVE TO SEE IT ON YOUR MACHINE.      To accurately check your blood pressure -  be sure to sit and relax for 5 minutes, you need your back supported, feet flat on the ground, arm heart level and relaxed.    Generally speaking, well controlled hypertension is below 130/80   for  most people  and if you are over 75, below 140/90  is acceptable.    Please take your medication as directed, and if you forget a dose  DO NOT DOUBLE THE DOSE THE NEXT DAY, just take is as you normally would.     It is important to stay on a low sodium diet :  1500 - 2000 mg of sodium a day  -  it is important to read labels.    Regular Exercise is very important as well.  Always gradually increase your exercise regimen.  Your goal is 30 minutes of a good cardiovascular exercise at least 5 days a week.    IF YOUR BMI (BODY MASS INDEX) IS OVER 25, LOSING WEIGHT WILL HELP CONTROL YOUR BLOOD PRESSURE.   Talk to me further if you need help doing this.        It is very important NOT to smoke  or use any tobacco products.  Talk to me about options if you want help quitting.    It is very important to keep your alcohol in take low.   Generally speaking, adult men should not drink more than 2 regular size beers a day, or no more than 2 ounces of liquor, or no more than 12 ounces of wine.  For adult women - the recommendations are half that.    BUT , THIS IS NOT UNIVERSAL   - be sure to ask me if alcohol is safe for  "you to drink, and if so, the acceptable amount.          For General Healthy Nutrition    (Remember - NOT A DIET!   Diets are only good for class reunions.)    These are my general good nutrition recommendations for most people.   I use the term \" diet \"  in these instructions to mean your overall nutrition - how you eat and drink.   If we talked about something different during your visit with me,  other than what is written below,  follow that advice instead.       For most people,  eating healthier means getting less added sugar and less processed foods in your diet    The fresher the better.    Added sugar is now a part of the nutrition label on manufactured food, so you can keep an eye on it easier.    But basically,  foods and beverages  that contain regular sugar and corn syrup are the main sources of added sugars.  Eating as little of these foods as you can is best.   One shocking example of the epidemic of added sugar is soda.    One can of regular soda contains about as much added sugar as 3 regular size doughnuts!     The other issue with processed foods is the amount of processed grains they contain , such as white flour.    This is also something you want to try to limit in your diet.     But, grain products are very important for your nutrition.    Whole grains are better for your body.     Cutting back on white breads, traditional pasta, baked goods, white rice,  and processed cereals will be healthier for you.   The better choices include whole grain breads,  whole wheat pasta,  brown rice, quinoa, barley, steel cut  or rolled oats.   If you eat cereal for breakfast, try to look for one made with whole grains and less sugar.   There are many people who have a problem with gluten, for a large variety of reasons.    Generally,  products made with wheat flour , barley or rye are the primary source of gluten.       Cutting back on saturated fats is important.    You want the majority of the meat that you " "eat to be chicken, fish or turkey.   Baked or broiled is best -  fried adds too much fat.    There are healthy fats that are important - fat is important for holding down appetite, vitamin absorption and several metabolic processes in the body.  Monounsaturated fats raise HDL (good cholesterol) and lower LDL (bad cholesterol).   Olive oil, peanut oil, nuts, seeds, and avocados are great sources of the good fats.       Ideas are:   Trade sour cream dip for hummus (which is rich in olive oil) or guacamole; use veggies or whole-wheat chips to dip.    Nuts are an excellent source of protein and healthy fats.   Tree nuts are the best kind, such as almonds or walnuts.   Just be careful - they are high in calories, so stick to a serving size.  (Most are about 200 calories for a 1/4 cup)      Proteins are very important for your body, and they also hold down your appetite.   Try to have protein with every meal.    These generally are meats, nuts, many beans, legumes, eggs, and dairy.   You will find protein in whole grain products and some green vegetables have a little too.     When you have dairy (if you can - many people are lactose intolerant) try to make it low fat.    Ideas are 1% milk, lowfat yogurt or cheeses, low fat cottage cheese.   I don't generally recommend FAT FREE because they often contain artificial products to improve taste, and the fat helps hold down your appetite.   If you are lactose intolerant, try to see if taking Lactaid before having dairy helps.      Fresh fruits and vegetables are VERY important.  The brighter the better.   Many vegetables are considered \"Free Foods\" - meaning you can eat as much as you want, and it does not matter.  These include tomatoes, cucumbers, celery, peppers, all the various lettuces and kale - to name a few.   Potatoes, corn and peas are starchy, so do have more calories, but are still healthy - you just want to watch the amount of them you eat.       Fruits are full " of wonderful nutrition.   They contain natural sugar called fructose, so eating them in moderation is best.   Diabetics may need to pay careful attention to how their body reacts to the sugar.  Some fruits might drastically increase their blood sugar.      Eating smaller meals with a couple of small snacks is better for your metabolism than not eating for long amounts of time  (breakfast is very important).   Trying to avoid large meals is helpful too.    Eating like this helps keep your appetite down and keeps you in burning metabolism rather than storage metabolism so your body will use the calories you eat.       I do not tell people to stop eating sweets or snack foods - just limit the amounts you have.  The less the better.   Pay attention to serving sizes, and treat them as a treat.        Foods like doughnuts, pop tarts, sugar cereals, cookies  ARE NOT GOOD FOR BREAKFAST.   They are loaded with sugar and will cause you to be hungrier in the day and often not feel well.    Caffeine needs to be limited - no more than 2 servings a day.  Some people can't have any at all.    (if you have any sleep or anxiety issues - stop the caffeine)   Coffee, many teas, many sodas, energy drinks, almost any diet supplement,  and chocolate all contain caffeine.      Water is important.   For most people, 8   x  8 ounces  a day are needed.  This may vary for some health issues.    If you need to be on a low sodium diet, that means looking at labels and eating only 1000 - 2000 mg of sodium a day.    Calcium intake is important.  3 servings of a high calcium food or drink a day is recommended.   This is usually a cup of milk, a cup of yogurt, an ounce of a hard cheese or 1.5 ounces of a soft cheese are the usual servings.   There are other high calcium foods - including soy or almond milk, broccoli,  almonds, dark green leafy vegetable.   Make sure you are not getting more than 1000  - 1200 mg of total calcium a day (unless you  have been told you need more by a doctor).    Vitamin D 3 is important to absorb the calcium and for your immune system.   For children, 400 IU a day is recommended.   For adults - 800 - 5000 IU a day  is recommended.  (Often the amount needed is individualized for adults - be sure to ask how much is right for you)    Physical activity is very important for good health.    Finding activities that give you regular exercise is very important for good health.  Try to find exercise you enjoy doing on a regular basis.    30 minutes at least 5 days a week of a good cardiovascular exercise is recommended.   That means something that gets your heart rate going faster than your usual baseline and you can find yourself breathing harder than usual while you are exercising.  If you have not done any exercise in a long time,  make sure you ask if its safe for you to start,  and be sure to gradually work up to your goal.      If you need to lose weight,  following these recommendations will help you.   And if you are doing all of this and still not losing weight, then its likely just the amount of food you are eating.   Learn to cut back on portion sizes.  Using smaller plates may help.  Healthy weight loss is  only about a pound a week.   You have to remember that whatever you do to lose the weight, you must be prepared to keep it up for life for the weight to stay off.     A lot of people have a lot of luck with using something like a fit bit,  or a program where you keep track of all of your calories that you eat and what you burn off in the day.

## 2024-05-03 ENCOUNTER — APPOINTMENT (OUTPATIENT)
Dept: PRIMARY CARE | Facility: CLINIC | Age: 54
End: 2024-05-03
Payer: COMMERCIAL

## 2024-05-29 DIAGNOSIS — C18.9 MALIGNANT NEOPLASM OF COLON, UNSPECIFIED PART OF COLON (MULTI): ICD-10-CM

## 2024-06-07 ENCOUNTER — LAB (OUTPATIENT)
Dept: LAB | Facility: HOSPITAL | Age: 54
End: 2024-06-07
Payer: COMMERCIAL

## 2024-06-07 DIAGNOSIS — C18.9 MALIGNANT NEOPLASM OF COLON, UNSPECIFIED PART OF COLON (MULTI): ICD-10-CM

## 2024-06-07 DIAGNOSIS — C18.7 MALIGNANT NEOPLASM OF SIGMOID COLON (MULTI): ICD-10-CM

## 2024-06-07 LAB — CEA SERPL-MCNC: <0.5 UG/L

## 2024-06-07 PROCEDURE — 82378 CARCINOEMBRYONIC ANTIGEN: CPT | Mod: GEALAB

## 2024-06-07 PROCEDURE — 36415 COLL VENOUS BLD VENIPUNCTURE: CPT

## 2024-06-28 LAB — SCAN RESULT: NORMAL

## 2024-07-12 ENCOUNTER — LAB (OUTPATIENT)
Dept: LAB | Facility: HOSPITAL | Age: 54
End: 2024-07-12
Payer: COMMERCIAL

## 2024-07-12 DIAGNOSIS — C18.9 MALIGNANT NEOPLASM OF COLON, UNSPECIFIED PART OF COLON (MULTI): ICD-10-CM

## 2024-07-12 DIAGNOSIS — Z15.89 MONOALLELIC MUTATION OF MUTYH GENE: Primary | ICD-10-CM

## 2024-07-12 LAB
ALBUMIN SERPL BCP-MCNC: 4.1 G/DL (ref 3.4–5)
ALP SERPL-CCNC: 55 U/L (ref 33–110)
ALT SERPL W P-5'-P-CCNC: 28 U/L (ref 7–45)
ANION GAP SERPL CALC-SCNC: 13 MMOL/L (ref 10–20)
AST SERPL W P-5'-P-CCNC: 21 U/L (ref 9–39)
BASOPHILS # BLD AUTO: 0.03 X10*3/UL (ref 0–0.1)
BASOPHILS NFR BLD AUTO: 0.3 %
BILIRUB SERPL-MCNC: 0.7 MG/DL (ref 0–1.2)
BUN SERPL-MCNC: 14 MG/DL (ref 6–23)
CALCIUM SERPL-MCNC: 9.2 MG/DL (ref 8.6–10.3)
CHLORIDE SERPL-SCNC: 104 MMOL/L (ref 98–107)
CO2 SERPL-SCNC: 25 MMOL/L (ref 21–32)
CREAT SERPL-MCNC: 0.79 MG/DL (ref 0.5–1.05)
EGFRCR SERPLBLD CKD-EPI 2021: 90 ML/MIN/1.73M*2
EOSINOPHIL # BLD AUTO: 0.07 X10*3/UL (ref 0–0.7)
EOSINOPHIL NFR BLD AUTO: 0.7 %
ERYTHROCYTE [DISTWIDTH] IN BLOOD BY AUTOMATED COUNT: 13 % (ref 11.5–14.5)
GLUCOSE SERPL-MCNC: 106 MG/DL (ref 74–99)
HCT VFR BLD AUTO: 44.3 % (ref 36–46)
HGB BLD-MCNC: 14.5 G/DL (ref 12–16)
HOLD SPECIMEN: NORMAL
IMM GRANULOCYTES # BLD AUTO: 0.01 X10*3/UL (ref 0–0.7)
IMM GRANULOCYTES NFR BLD AUTO: 0.1 % (ref 0–0.9)
LYMPHOCYTES # BLD AUTO: 2.17 X10*3/UL (ref 1.2–4.8)
LYMPHOCYTES NFR BLD AUTO: 22.9 %
MCH RBC QN AUTO: 30.4 PG (ref 26–34)
MCHC RBC AUTO-ENTMCNC: 32.7 G/DL (ref 32–36)
MCV RBC AUTO: 93 FL (ref 80–100)
MONOCYTES # BLD AUTO: 0.49 X10*3/UL (ref 0.1–1)
MONOCYTES NFR BLD AUTO: 5.2 %
NEUTROPHILS # BLD AUTO: 6.71 X10*3/UL (ref 1.2–7.7)
NEUTROPHILS NFR BLD AUTO: 70.8 %
PLATELET # BLD AUTO: 223 X10*3/UL (ref 150–450)
POTASSIUM SERPL-SCNC: 3.9 MMOL/L (ref 3.5–5.3)
PROT SERPL-MCNC: 7.2 G/DL (ref 6.4–8.2)
RBC # BLD AUTO: 4.77 X10*6/UL (ref 4–5.2)
SODIUM SERPL-SCNC: 138 MMOL/L (ref 136–145)
WBC # BLD AUTO: 9.5 X10*3/UL (ref 4.4–11.3)

## 2024-07-12 PROCEDURE — 85025 COMPLETE CBC W/AUTO DIFF WBC: CPT

## 2024-07-12 PROCEDURE — 84075 ASSAY ALKALINE PHOSPHATASE: CPT

## 2024-07-12 PROCEDURE — 36415 COLL VENOUS BLD VENIPUNCTURE: CPT

## 2024-07-15 ENCOUNTER — HOSPITAL ENCOUNTER (OUTPATIENT)
Dept: RADIOLOGY | Facility: HOSPITAL | Age: 54
Discharge: HOME | End: 2024-07-15
Payer: COMMERCIAL

## 2024-07-15 DIAGNOSIS — R97.8 OTHER ABNORMAL TUMOR MARKERS: ICD-10-CM

## 2024-07-15 DIAGNOSIS — C18.9 MALIGNANT NEOPLASM OF COLON, UNSPECIFIED (MULTI): ICD-10-CM

## 2024-07-15 PROCEDURE — 2550000001 HC RX 255 CONTRASTS: Performed by: INTERNAL MEDICINE

## 2024-07-15 PROCEDURE — 74177 CT ABD & PELVIS W/CONTRAST: CPT

## 2024-07-15 PROCEDURE — 74177 CT ABD & PELVIS W/CONTRAST: CPT | Performed by: RADIOLOGY

## 2024-07-15 PROCEDURE — A9698 NON-RAD CONTRAST MATERIALNOC: HCPCS | Performed by: INTERNAL MEDICINE

## 2024-07-18 ENCOUNTER — APPOINTMENT (OUTPATIENT)
Dept: GASTROENTEROLOGY | Facility: CLINIC | Age: 54
End: 2024-07-18
Payer: COMMERCIAL

## 2024-07-22 ENCOUNTER — APPOINTMENT (OUTPATIENT)
Dept: HEMATOLOGY/ONCOLOGY | Facility: CLINIC | Age: 54
End: 2024-07-22
Payer: COMMERCIAL

## 2024-08-08 ENCOUNTER — APPOINTMENT (OUTPATIENT)
Dept: HEMATOLOGY/ONCOLOGY | Facility: CLINIC | Age: 54
End: 2024-08-08
Payer: COMMERCIAL

## 2024-08-12 ENCOUNTER — APPOINTMENT (OUTPATIENT)
Dept: DERMATOLOGY | Facility: CLINIC | Age: 54
End: 2024-08-12
Payer: COMMERCIAL

## 2024-08-13 ENCOUNTER — OFFICE VISIT (OUTPATIENT)
Dept: HEMATOLOGY/ONCOLOGY | Facility: CLINIC | Age: 54
End: 2024-08-13
Payer: COMMERCIAL

## 2024-08-13 VITALS
OXYGEN SATURATION: 96 % | SYSTOLIC BLOOD PRESSURE: 168 MMHG | RESPIRATION RATE: 17 BRPM | HEART RATE: 63 BPM | DIASTOLIC BLOOD PRESSURE: 104 MMHG | WEIGHT: 293 LBS | HEIGHT: 68 IN | BODY MASS INDEX: 44.41 KG/M2 | TEMPERATURE: 97.9 F

## 2024-08-13 DIAGNOSIS — C18.9 MALIGNANT NEOPLASM OF COLON, UNSPECIFIED PART OF COLON (MULTI): ICD-10-CM

## 2024-08-13 PROCEDURE — 3077F SYST BP >= 140 MM HG: CPT | Performed by: INTERNAL MEDICINE

## 2024-08-13 PROCEDURE — 99213 OFFICE O/P EST LOW 20 MIN: CPT | Performed by: INTERNAL MEDICINE

## 2024-08-13 PROCEDURE — 3080F DIAST BP >= 90 MM HG: CPT | Performed by: INTERNAL MEDICINE

## 2024-08-13 PROCEDURE — 3008F BODY MASS INDEX DOCD: CPT | Performed by: INTERNAL MEDICINE

## 2024-08-13 SDOH — ECONOMIC STABILITY: FOOD INSECURITY: WITHIN THE PAST 12 MONTHS, THE FOOD YOU BOUGHT JUST DIDN'T LAST AND YOU DIDN'T HAVE MONEY TO GET MORE.: NEVER TRUE

## 2024-08-13 SDOH — ECONOMIC STABILITY: FOOD INSECURITY: WITHIN THE PAST 12 MONTHS, YOU WORRIED THAT YOUR FOOD WOULD RUN OUT BEFORE YOU GOT MONEY TO BUY MORE.: NEVER TRUE

## 2024-08-13 ASSESSMENT — ENCOUNTER SYMPTOMS
LOSS OF SENSATION IN FEET: 0
OCCASIONAL FEELINGS OF UNSTEADINESS: 0
DEPRESSION: 0

## 2024-08-13 ASSESSMENT — PATIENT HEALTH QUESTIONNAIRE - PHQ9
2. FEELING DOWN, DEPRESSED OR HOPELESS: NOT AT ALL
1. LITTLE INTEREST OR PLEASURE IN DOING THINGS: NOT AT ALL
SUM OF ALL RESPONSES TO PHQ9 QUESTIONS 1 AND 2: 0

## 2024-08-13 ASSESSMENT — PAIN SCALES - GENERAL: PAINLEVEL: 0-NO PAIN

## 2024-08-13 NOTE — PROGRESS NOTES
"Patient ID: Latasha Rodrigues is a 53 y.o. female.  Referring Physician: No referring provider defined for this encounter.  Primary Care Provider: Joseline Salas MD  Visit Type:  Follow Up     Subjective    HPI  IMPRESSION:  1.  No evidence of recurrent malignancy acute findings. No  significant interval change from the previous exam.     History of Present Illness:      ID Statement:    LATASHA RODRIGUES is a 52 year old Female       Interval History:    Patient presents for follow up. She c/o intermittent neuropathy. Otherwise doing well. Denies fever, chills, night sweats, anorexia, weight loss, CP, SOB, abd pain,  N/V/D/C, bleeding, and any other complaints at this time.      No complaints.  Episodic pain in the lower abdomen.  Not deemed pathological but probably secondary to the effects of a colectomy or hemicolectomy.  No specific complaints.  She is concerned about her weight which she is gaining and is considering seeking  medical and surgical attention in regard to her weight.  His CEA and CT abdomen and pelvis is negative for any evidence of disease progression.   Review of Systems - Oncology     Objective   BSA: 2.65 meters squared  BP (!) 168/104 (BP Location: Left arm, Patient Position: Sitting, BP Cuff Size: Adult) Comment (BP Location): lower  Pulse 63   Temp 36.6 °C (97.9 °F) (Temporal)   Resp 17   Ht (S) 1.716 m (5' 7.56\")   Wt 147 kg (324 lb 3 oz)   SpO2 96%   BMI 49.94 kg/m²      has a past medical history of Acute urinary tract infection (04/03/2024), Allergic rhinitis (04/03/2024), Cancer of sigmoid colon (Multi), Class 3 severe obesity with body mass index (BMI) of 45.0 to 49.9 in adult (Multi) (01/18/2024), Colon cancer (Multi) (Oct 2020), Colon polyp (Oct 2020), Constipation (04/03/2024), Diseases of lips (12/28/2015), Hepatic steatosis, Hypertension, Left sided abdominal pain (04/03/2024), Morbid (severe) obesity due to excess calories (Multi) (09/09/2019), Personal history of other " diseases of the digestive system (05/13/2015), Personal history of other diseases of the female genital tract (04/16/2015), Personal history of other specified conditions (10/16/2020), Portal vein thrombosis (12/01/2021), Right upper quadrant pain (04/16/2015), and Unspecified open wound of abdominal wall, unspecified quadrant without penetration into peritoneal cavity, initial encounter (12/01/2021).   has a past surgical history that includes Other surgical history (06/01/2022); Other surgical history (10/07/2020); Other surgical history (05/13/2015); US guided abscess drain (10/28/2020); Cholecystectomy (2015); Colon surgery (Oct 2020); and Colostomy (Oct 2020).  Family History   Problem Relation Name Age of Onset    Other (carcinoma) Mother      Liver disease Father Don     Breast cancer Mother's Sister      Colon cancer Maternal Grandmother Raquel      Oncology History    No history exists.       Latasha Rodrigues  reports that she has quit smoking. She has never used smokeless tobacco.  She  reports current alcohol use of about 5.0 standard drinks of alcohol per week.  She  reports no history of drug use.    Physical Exam    WBC   Date/Time Value Ref Range Status   07/12/2024 11:38 AM 9.5 4.4 - 11.3 x10*3/uL Final   04/25/2024 08:39 AM 8.1 4.4 - 11.3 x10*3/uL Final   07/14/2023 08:04 AM 8.4 4.4 - 11.3 x10E9/L Final   01/13/2023 08:37 AM 7.3 4.4 - 11.3 x10E9/L Final   07/12/2022 10:30 AM 9.0 4.4 - 11.3 x10E9/L Final     nRBC   Date Value Ref Range Status   04/25/2024 0.0 0.0 - 0.0 /100 WBCs Final   03/11/2019 0.0 0.0 - 0.0 /100 WBC Final     RBC   Date Value Ref Range Status   07/12/2024 4.77 4.00 - 5.20 x10*6/uL Final   04/25/2024 4.76 4.00 - 5.20 x10*6/uL Final   07/14/2023 4.71 4.00 - 5.20 x10E12/L Final   01/13/2023 4.71 4.00 - 5.20 x10E12/L Final   07/12/2022 4.81 4.00 - 5.20 x10E12/L Final     Hemoglobin   Date Value Ref Range Status   07/12/2024 14.5 12.0 - 16.0 g/dL Final   04/25/2024 14.6 12.0 - 16.0 g/dL  "Final   07/14/2023 14.4 12.0 - 16.0 g/dL Final   01/13/2023 14.2 12.0 - 16.0 g/dL Final   07/12/2022 14.4 12.0 - 16.0 g/dL Final     Hematocrit   Date Value Ref Range Status   07/12/2024 44.3 36.0 - 46.0 % Final   04/25/2024 44.6 36.0 - 46.0 % Final   07/14/2023 43.9 36.0 - 46.0 % Final   01/13/2023 42.8 36.0 - 46.0 % Final   07/12/2022 44.8 36.0 - 46.0 % Final     MCV   Date/Time Value Ref Range Status   07/12/2024 11:38 AM 93 80 - 100 fL Final   04/25/2024 08:39 AM 94 80 - 100 fL Final   07/14/2023 08:04 AM 93 80 - 100 fL Final   01/13/2023 08:37 AM 91 80 - 100 fL Final   07/12/2022 10:30 AM 93 80 - 100 fL Final     MCH   Date/Time Value Ref Range Status   07/12/2024 11:38 AM 30.4 26.0 - 34.0 pg Final   04/25/2024 08:39 AM 30.7 26.0 - 34.0 pg Final     MCHC   Date/Time Value Ref Range Status   07/12/2024 11:38 AM 32.7 32.0 - 36.0 g/dL Final   04/25/2024 08:39 AM 32.7 32.0 - 36.0 g/dL Final   07/14/2023 08:04 AM 32.8 32.0 - 36.0 g/dL Final   01/13/2023 08:37 AM 33.2 32.0 - 36.0 g/dL Final   07/12/2022 10:30 AM 32.1 32.0 - 36.0 g/dL Final     RDW   Date/Time Value Ref Range Status   07/12/2024 11:38 AM 13.0 11.5 - 14.5 % Final   04/25/2024 08:39 AM 13.3 11.5 - 14.5 % Final   07/14/2023 08:04 AM 12.8 11.5 - 14.5 % Final   01/13/2023 08:37 AM 13.1 11.5 - 14.5 % Final   07/12/2022 10:30 AM 13.3 11.5 - 14.5 % Final     Platelets   Date/Time Value Ref Range Status   07/12/2024 11:38  150 - 450 x10*3/uL Final   04/25/2024 08:39  150 - 450 x10*3/uL Final   07/14/2023 08:04  150 - 450 x10E9/L Final   01/13/2023 08:37  150 - 450 x10E9/L Final   07/12/2022 10:30  150 - 450 x10E9/L Final     No results found for: \"MPV\"  Neutrophils %   Date/Time Value Ref Range Status   07/12/2024 11:38 AM 70.8 40.0 - 80.0 % Final   04/25/2024 08:39 AM 64.0 40.0 - 80.0 % Final   07/14/2023 08:04 AM 65.5 40.0 - 80.0 % Final   01/13/2023 08:37 AM 59.0 40.0 - 80.0 % Final   07/12/2022 10:30 AM 69.1 40.0 - 80.0 % " Final     Immature Granulocytes %, Automated   Date/Time Value Ref Range Status   07/12/2024 11:38 AM 0.1 0.0 - 0.9 % Final     Comment:     Immature Granulocyte Count (IG) includes promyelocytes, myelocytes and metamyelocytes but does not include bands. Percent differential counts (%) should be interpreted in the context of the absolute cell counts (cells/UL).   04/25/2024 08:39 AM 0.2 0.0 - 0.9 % Final     Comment:     Immature Granulocyte Count (IG) includes promyelocytes, myelocytes and metamyelocytes but does not include bands. Percent differential counts (%) should be interpreted in the context of the absolute cell counts (cells/UL).   07/14/2023 08:04 AM 0.2 0.0 - 0.9 % Final     Comment:      Immature Granulocyte Count (IG) includes promyelocytes,    myelocytes and metamyelocytes but does not include bands.   Percent differential counts (%) should be interpreted in the   context of the absolute cell counts (cells/L).     01/13/2023 08:37 AM 0.1 0.0 - 0.9 % Final     Comment:      Immature Granulocyte Count (IG) includes promyelocytes,    myelocytes and metamyelocytes but does not include bands.   Percent differential counts (%) should be interpreted in the   context of the absolute cell counts (cells/L).     07/12/2022 10:30 AM 0.2 0.0 - 0.9 % Final     Comment:      Immature Granulocyte Count (IG) includes promyelocytes,    myelocytes and metamyelocytes but does not include bands.   Percent differential counts (%) should be interpreted in the   context of the absolute cell counts (cells/L).       Lymphocytes %   Date/Time Value Ref Range Status   07/12/2024 11:38 AM 22.9 13.0 - 44.0 % Final   04/25/2024 08:39 AM 27.4 13.0 - 44.0 % Final   07/14/2023 08:04 AM 27.4 13.0 - 44.0 % Final     Comment:      Percent differential counts (%) should be interpreted in the   context of the absolute cell counts (cells/L).     01/13/2023 08:37 AM 32.5 13.0 - 44.0 % Final     Comment:      Percent differential counts (%)  should be interpreted in the   context of the absolute cell counts (cells/L).     07/12/2022 10:30 AM 24.4 13.0 - 44.0 % Final     Comment:      Percent differential counts (%) should be interpreted in the   context of the absolute cell counts (cells/L).       Monocytes %   Date/Time Value Ref Range Status   07/12/2024 11:38 AM 5.2 2.0 - 10.0 % Final   04/25/2024 08:39 AM 6.6 2.0 - 10.0 % Final   07/14/2023 08:04 AM 5.4 2.0 - 10.0 % Final   01/13/2023 08:37 AM 6.4 2.0 - 10.0 % Final   07/12/2022 10:30 AM 5.0 2.0 - 10.0 % Final     Eosinophils %   Date/Time Value Ref Range Status   07/12/2024 11:38 AM 0.7 0.0 - 6.0 % Final   04/25/2024 08:39 AM 1.2 0.0 - 6.0 % Final   07/14/2023 08:04 AM 1.0 0.0 - 6.0 % Final   01/13/2023 08:37 AM 1.2 0.0 - 6.0 % Final   07/12/2022 10:30 AM 1.0 0.0 - 6.0 % Final     Basophils %   Date/Time Value Ref Range Status   07/12/2024 11:38 AM 0.3 0.0 - 2.0 % Final   04/25/2024 08:39 AM 0.6 0.0 - 2.0 % Final   07/14/2023 08:04 AM 0.5 0.0 - 2.0 % Final   01/13/2023 08:37 AM 0.8 0.0 - 2.0 % Final   07/12/2022 10:30 AM 0.3 0.0 - 2.0 % Final     Neutrophils Absolute   Date/Time Value Ref Range Status   07/12/2024 11:38 AM 6.71 1.20 - 7.70 x10*3/uL Final     Comment:     Percent differential counts (%) should be interpreted in the context of the absolute cell counts (cells/uL).   04/25/2024 08:39 AM 5.15 1.20 - 7.70 x10*3/uL Final     Comment:     Percent differential counts (%) should be interpreted in the context of the absolute cell counts (cells/uL).   07/14/2023 08:04 AM 5.50 1.20 - 7.70 x10E9/L Final   01/13/2023 08:37 AM 4.31 1.20 - 7.70 x10E9/L Final   07/12/2022 10:30 AM 6.22 1.20 - 7.70 x10E9/L Final     Immature Granulocytes Absolute, Automated   Date/Time Value Ref Range Status   07/12/2024 11:38 AM 0.01 0.00 - 0.70 x10*3/uL Final   04/25/2024 08:39 AM 0.02 0.00 - 0.70 x10*3/uL Final     Lymphocytes Absolute   Date/Time Value Ref Range Status   07/12/2024 11:38 AM 2.17 1.20 - 4.80  "x10*3/uL Final   04/25/2024 08:39 AM 2.21 1.20 - 4.80 x10*3/uL Final   07/14/2023 08:04 AM 2.30 1.20 - 4.80 x10E9/L Final   01/13/2023 08:37 AM 2.37 1.20 - 4.80 x10E9/L Final   07/12/2022 10:30 AM 2.20 1.20 - 4.80 x10E9/L Final     Monocytes Absolute   Date/Time Value Ref Range Status   07/12/2024 11:38 AM 0.49 0.10 - 1.00 x10*3/uL Final   04/25/2024 08:39 AM 0.53 0.10 - 1.00 x10*3/uL Final   07/14/2023 08:04 AM 0.45 0.10 - 1.00 x10E9/L Final   01/13/2023 08:37 AM 0.47 0.10 - 1.00 x10E9/L Final   07/12/2022 10:30 AM 0.45 0.10 - 1.00 x10E9/L Final     Eosinophils Absolute   Date/Time Value Ref Range Status   07/12/2024 11:38 AM 0.07 0.00 - 0.70 x10*3/uL Final   04/25/2024 08:39 AM 0.10 0.00 - 0.70 x10*3/uL Final   07/14/2023 08:04 AM 0.08 0.00 - 0.70 x10E9/L Final   01/13/2023 08:37 AM 0.09 0.00 - 0.70 x10E9/L Final   07/12/2022 10:30 AM 0.09 0.00 - 0.70 x10E9/L Final     Basophils Absolute   Date/Time Value Ref Range Status   07/12/2024 11:38 AM 0.03 0.00 - 0.10 x10*3/uL Final   04/25/2024 08:39 AM 0.05 0.00 - 0.10 x10*3/uL Final   07/14/2023 08:04 AM 0.04 0.00 - 0.10 x10E9/L Final   01/13/2023 08:37 AM 0.06 0.00 - 0.10 x10E9/L Final   07/12/2022 10:30 AM 0.03 0.00 - 0.10 x10E9/L Final       No components found for: \"PT\"  aPTT   Date/Time Value Ref Range Status   05/31/2022 01:49 PM 37 26 - 39 sec Final     Comment:       THE APTT IS NO LONGER USED FOR MONITORING     UNFRACTIONATED HEPARIN THERAPY.    FOR MONITORING HEPARIN THERAPY,     USE THE HEPARIN ASSAY.     07/24/2023: 7/17/2023 compared to CT abdomen and pelvis dated 01/16/2023.  No CT apparent acute process within the abdomen and pelvis.  No evidence to suggest residual neoplastic involvement or metastatic  disease.  Postoperative change consistent with partial colectomy unchanged chronic findings.  CEA is less than 0.5.  CEA q. monthly CT abdomen and pelvis in 1 year.     Assessment/Plan      RTC 1 year:   s/p hemicolectomy and colostomy for a Stage IIB " sigmoid colon cancer, recovering well. 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 However cycle 2 was c/b severe full body/nerve pain lasting over a week.      repeat CT C/A/P in 6 months. monitor CBCD, CMP, and CEA: f/u with genetics     Hx of portal vein thrombosis s/p Eliquis for a total of 6 months due to need for adjuvant chemotherapy and thrombosis risk from chemotherapy. Thrombophilia work up is negative. ok to discontinue eliquis.      RTC in 6 moths with CT C/A/P and labs. Switch her to 6 monthly thereafter.   CT CAP: SKELETON: Degenerative changes. No acute process.      IMPRESSION:  No CT evidence of an acute process within the abdomen or pelvis.  Unchanged findings as detailed above.      Signed by: Elias Lawson 7/16/2024  Negative for evidence of minimal residual disease.  Diagnoses and all orders for this visit:  Malignant neoplasm of colon, unspecified part of colon (Multi)  -     Clinic Appointment Request Follow Up; Future  -     Signatera; [facility] - Miscellaneous Genetics Test; Future  -     Signatera; [facility] - Miscellaneous Genetics Test; Future  -     CT abdomen pelvis w IV and oral contrast; Future           Leobardo-Arthur Rueda MD

## 2024-08-13 NOTE — PATIENT INSTRUCTIONS
Today you met with your hematologist/oncologist.  Recent labs were discussed and questions answered.  Scheduling orders were placed.  While we appreciate that you verbalized understanding, if any questions arise after leaving, please do not hesitate to call the office to discuss.  [phone] Shira Muhammad.  Dr Rueda will see you in 6 months after your scan. Signatera will be drawn in 3 months.

## 2024-08-15 ENCOUNTER — APPOINTMENT (OUTPATIENT)
Dept: GASTROENTEROLOGY | Facility: CLINIC | Age: 54
End: 2024-08-15
Payer: COMMERCIAL

## 2024-08-27 ASSESSMENT — PATIENT GLOBAL ASSESSMENT (PGA): WHAT IS THE PGA: PATIENT GLOBAL ASSESSMENT:  1 - CLEAR

## 2024-08-27 ASSESSMENT — DERMATOLOGY QUALITY OF LIFE (QOL) ASSESSMENT
RATE HOW BOTHERED YOU ARE BY EFFECTS OF YOUR SKIN PROBLEMS ON YOUR ACTIVITIES (EG, GOING OUT, ACCOMPLISHING WHAT YOU WANT, WORK ACTIVITIES OR YOUR RELATIONSHIPS WITH OTHERS): 0 - NEVER BOTHERED
WHAT SINGLE SKIN CONDITION LISTED BELOW IS THE PATIENT ANSWERING THE QUALITY-OF-LIFE ASSESSMENT QUESTIONS ABOUT: NONE OF THE ABOVE
RATE HOW EMOTIONALLY BOTHERED YOU ARE BY YOUR SKIN PROBLEM (FOR EXAMPLE, WORRY, EMBARRASSMENT, FRUSTRATION): 2
RATE HOW BOTHERED YOU ARE BY EFFECTS OF YOUR SKIN PROBLEMS ON YOUR ACTIVITIES (EG, GOING OUT, ACCOMPLISHING WHAT YOU WANT, WORK ACTIVITIES OR YOUR RELATIONSHIPS WITH OTHERS): 0 - NEVER BOTHERED
RATE HOW BOTHERED YOU ARE BY SYMPTOMS OF YOUR SKIN PROBLEM (EG, ITCHING, STINGING BURNING, HURTING OR SKIN IRRITATION): 3
WHAT SINGLE SKIN CONDITION LISTED BELOW IS THE PATIENT ANSWERING THE QUALITY-OF-LIFE ASSESSMENT QUESTIONS ABOUT: NONE OF THE ABOVE
RATE HOW BOTHERED YOU ARE BY SYMPTOMS OF YOUR SKIN PROBLEM (EG, ITCHING, STINGING BURNING, HURTING OR SKIN IRRITATION): 3
RATE HOW EMOTIONALLY BOTHERED YOU ARE BY YOUR SKIN PROBLEM (FOR EXAMPLE, WORRY, EMBARRASSMENT, FRUSTRATION): 2

## 2024-08-28 ENCOUNTER — APPOINTMENT (OUTPATIENT)
Dept: DERMATOLOGY | Facility: CLINIC | Age: 54
End: 2024-08-28
Payer: COMMERCIAL

## 2024-08-28 DIAGNOSIS — Z12.83 ENCOUNTER FOR SCREENING FOR MALIGNANT NEOPLASM OF SKIN: Primary | ICD-10-CM

## 2024-08-28 DIAGNOSIS — L82.1 SEBORRHEIC KERATOSIS: ICD-10-CM

## 2024-08-28 DIAGNOSIS — L81.4 LENTIGO: ICD-10-CM

## 2024-08-28 PROCEDURE — 99203 OFFICE O/P NEW LOW 30 MIN: CPT | Performed by: NURSE PRACTITIONER

## 2024-08-28 PROCEDURE — 1036F TOBACCO NON-USER: CPT | Performed by: NURSE PRACTITIONER

## 2024-08-28 NOTE — PROGRESS NOTES
Subjective   Latasha Rodrigues is a 53 y.o. female who presents for the following: Skin Check.  Skin Cancer Screening  She has a history of moderate sun exposure. She is in the sun occasionally. She uses sunscreen occasionally. She reports no skin symptoms. Her moles are not changing.    PMHX colon cancer.   Concerns to face and bilateral legs, denies pain, itching or bleeding. Notes changes in size and color.         Objective   Well appearing patient in no apparent distress; mood and affect are within normal limits.    A focused examination was performed including lower extremities, including the legs, feet, toes, and toenails and head, including the scalp, face, neck, nose, ears, eyelids, and lips. All findings within normal limits unless otherwise noted below.    Face, neck, legs  Scattered benign lesions    Left Temple, Right Thigh - Anterior  Stuck on verrucous, tan-brown papules and plaques.      Head, Left Leg, Neck, Right Leg  Scattered tan macules in sun-exposed areas.      Assessment/Plan   Encounter for screening for malignant neoplasm of skin  Face, neck, legs    - Protective measures, such as avoiding skin exposure to sunlight during peak sun hours (10 AM to 3 PM), wearing protective clothing, and applying high-SPF sunscreen, are essential for reducing exposure to harmful ultraviolet (UV) light.  - Monthly self-examination of the skin is helpful to detect new lesions or changes in existing lesions.  - Discussed signs and symptoms of sun-related skin cancers.   - Make sure your moles are not signs of skin cancer (melanoma). Remember the ABCDEs of melanoma lesions:  A - Asymmetry: One half of the lesion does not mirror the other half.  B - Border: The borders are irregular or vague (indistinct).  C - Color: More than one color may be noted within the mole.  D - Diameter: Size greater than 6 mm (roughly the size of a pencil eraser) may be concerning.  E - Evolving: Notable changes in the lesion over time are  suspicious signs for skin cancer.    Seborrheic keratosis (2)  Right Thigh - Anterior; Left Temple    Although Seborrheic Keratoses can be troublesome and unsightly, they are entirely benign.  Removal of Seborrheic Keratoses is considered a cosmetic procedure. Removal is typically performed using liquid nitrogen cryotherapy.  Treatment of current lesions does not prevent the development of new Seborrheic Keratoses in the future.    Lentigo (4)  Head; Left Leg; Right Leg; Neck    A solar lentigo (plural, solar lentigines), sometimes called an age spot or liver spot, is a brown macule (small, flat, smooth area of skin) caused by chronic sun or artificial ultraviolet (UV) light exposure. There may be just one lentigo or there may be multiple. This type of lentigo is different from lentigo simplex (discussed separately) because it is caused by exposure to UV light. Solar lentigines are benign, but they do indicate excessive sun exposure, a risk factor for the development of skin cancer.  Lesions are benign, no treatment needed.     Follow up in 12 months for a total body skin exam. Please call me if there are any changes or development of concerning symptoms (lesion/skin condition is changing, bleeding, enlarging, or worsening).

## 2024-08-29 ENCOUNTER — APPOINTMENT (OUTPATIENT)
Dept: PRIMARY CARE | Facility: CLINIC | Age: 54
End: 2024-08-29
Payer: COMMERCIAL

## 2024-08-29 VITALS
DIASTOLIC BLOOD PRESSURE: 83 MMHG | BODY MASS INDEX: 48.89 KG/M2 | SYSTOLIC BLOOD PRESSURE: 122 MMHG | WEIGHT: 293 LBS | HEART RATE: 69 BPM | OXYGEN SATURATION: 98 %

## 2024-08-29 DIAGNOSIS — Z12.31 SCREENING MAMMOGRAM, ENCOUNTER FOR: ICD-10-CM

## 2024-08-29 DIAGNOSIS — Z01.419 ENCOUNTER FOR ANNUAL ROUTINE GYNECOLOGICAL EXAMINATION: Primary | ICD-10-CM

## 2024-08-29 DIAGNOSIS — Z13.6 SCREENING FOR CARDIOVASCULAR CONDITION: ICD-10-CM

## 2024-08-29 DIAGNOSIS — Z01.419 ENCOUNTER FOR CERVICAL PAP SMEAR WITH PELVIC EXAM: ICD-10-CM

## 2024-08-29 DIAGNOSIS — C18.9 MALIGNANT NEOPLASM OF COLON, UNSPECIFIED PART OF COLON (MULTI): ICD-10-CM

## 2024-08-29 PROCEDURE — 99396 PREV VISIT EST AGE 40-64: CPT | Performed by: FAMILY MEDICINE

## 2024-08-29 PROCEDURE — 87624 HPV HI-RISK TYP POOLED RSLT: CPT

## 2024-08-29 NOTE — PATIENT INSTRUCTIONS
Appt 6 mos       WELL VISIT/PREVENTATIVE VISIT INSTRUCTIONS:        Call [phone] to schedule any testing ordered at [facility].      For a mammogram, call   [phone]    Please work on healthy nutrition,  optimal sleep, and regular exercise to feel better.    If you smoke - please quit, and if you drink alcohol, please limit your intake.       Be sure to ask me about any vaccines you may be due for,  and ask me any questions you may have about vaccines.   Generally -  a flu shot is recommended every fall for everyone over 6 months of age,  a pneumonia shot is recommended for anyone 65 and over or who has chronic conditions such as diabetes, heart or lung disease,  the shingles vaccines are recommended for people age 50 and over,   a Tetnas/Pertussis booster is very 10 years (after the childhood set);  the RSV vaccine is for people age 65 and over,  and the COVID vaccines are recommended for everyone, but the boosters are often particular people, so ask me if you qualify.      There may be more vaccines you qualify for depending on your medical conditions, so be sure to ask me about that if you have any chronic illnesses.    Some people have insurance that will pay for the vaccines at the pharmacy, and some your doctors office - so be sure to check with your insurance about the best place to get your vaccines and your coverage of them.     Generally speaking,  good nutrition consists of lean meats, like chicken, fish and turkey (not fried);    plenty of fruits and vegetables (the fresher the better);   Less sugars, saturated fats, and processed foods - especially those made with white flour.   Try to eat the majority of your grain foods  as whole grains.   Keep an eye on your total calories in a day and serving sizes on packaging - this will help you limit your overall intake.    Remember, to lose weight (if you need to), your total calorie intake has to be about 300 - 500 calories less than what you  "burn in a day.   That number depends on your age, gender and body size.   Some people find a fitbit (calorie tracking device) helpful.      Please be sure to see the dentist every 6 months.    Please see the eye doctor no longer than every 2 years.    When you have your Living Will and Medical Power of  papers completed   (they have to be witnessed by 2 non-relatives or notarized to be legally binding),     please keep your originals in a safe place in your home, but make sure all your physicians have copies and that you take copies with you when you go to the hospital.        GETTING TEST RESULTS:    YOU WILL ALWAYS FIND OUT ABOUT ALL YOUR TEST RESULTS FROM ME.  I do not use the \"no news is good news\" policy.   The only time you would not, is if I have told you to get the testing done, and make a follow up appointment to go over it.    Then I will be waiting to talk to you at the visit about your test results.     I have a few different ways I get test results to you  (if its something urgent, we call you)  :       If you have a [employer] card -  I will have your test results on your secureach card within a week.  You should get a phone call telling you when the results are on the card, or just call the card in a week.   If two weeks go  by and you have not heard anything, call the card to see if the results are there,  and if not,  leave me a message.  If you are having trouble using [employer], they have a support line you should call :   [phone];   If you have lost your card, I need to know.     IT'S VERY IMPORTANT THAT YOU CALL TO LISTEN TO YOUR RESULTS, AS IT THEN LETS ME KNOW YOU GOT YOUR RESULTS.        If you get your test results on MY CHART,  you may commonly see your results even before I do.      I will always annotate a message on your results so you know that I saw them and how I feel about them.     If you need some help with MY CHART call the support number at [phone].    IF " "I mail your results to you,   I need to hear from you if you do not receive them within 2 weeks.      Be sure to let me know your preference for getting results.         BILLING FOR PREVENTATIVE VISITS.     Most insurance companies pay for a yearly \"Wellness Check\"  or they may call it a \"Preventative Physical\"   - but it's important to know what your plan covers ahead of the visit.    It's also a good idea to find out what sort of preventative testing they will cover for screening tests - like cholesterol, blood sugar, or colonoscopies. Also, find out which vaccines are covered and if you have any responsibility in paying for them.       If at your Wellness Visit,  we cover anything to do with problems/issues  you are having or  chronic medications/ medical conditions you have,   there will ALSO be a regular office charge that day.     Blood work  or testing obtained that has anything to do with an acute issue or chronic condition is billed under that diagnosis and not screening.       It's a good idea to find out from your insurance what is covered and what is your responsibility for all of the above possible charges.           Most importantly,   if you ever have any questions or concerns that cannot wait until your next visit with me,  you can message me through MY CHART or call the office and leave a voice mail message  (please allow for at least 24 hours for responses for these messages).       Take good care.   Dr Blank          For General Healthy Nutrition    (Remember - NOT A DIET!   Diets are only good for class reunions.)    These are my general good nutrition recommendations for most people.   I use the term \" diet \"  in these instructions to mean your overall nutrition - how you eat and drink.   If we talked about something different during your visit with me,  other than what is written below,  follow that advice instead.       For most people,  eating healthier means getting less added sugar and less " processed foods in your diet    The fresher the better.    Added sugar is now a part of the nutrition label on manufactured food, so you can keep an eye on it easier.    But basically,  foods and beverages  that contain regular sugar and corn syrup are the main sources of added sugars.  Eating as little of these foods as you can is best.   One shocking example of the epidemic of added sugar is soda.    One can of regular soda contains about as much added sugar as 3 regular size doughnuts!     The other issue with processed foods is the amount of processed grains they contain , such as white flour.    This is also something you want to try to limit in your diet.     But, grain products are very important for your nutrition.    Whole grains are better for your body.     Cutting back on white breads, traditional pasta, baked goods, white rice,  and processed cereals will be healthier for you.   The better choices include whole grain breads,  whole wheat pasta,  brown rice, quinoa, barley, steel cut  or rolled oats.   If you eat cereal for breakfast, try to look for one made with whole grains and less sugar.   There are many people who have a problem with gluten, for a large variety of reasons.    Generally,  products made with wheat flour , barley or rye are the primary source of gluten.       Cutting back on saturated fats is important.    You want the majority of the meat that you eat to be chicken, fish or turkey.   Baked or broiled is best -  fried adds too much fat.    There are healthy fats that are important - fat is important for holding down appetite, vitamin absorption and several metabolic processes in the body.  Monounsaturated fats raise HDL (good cholesterol) and lower LDL (bad cholesterol).   Olive oil, peanut oil, nuts, seeds, and avocados are great sources of the good fats.       Ideas are:   Trade sour cream dip for hummus (which is rich in olive oil) or guacamole; use veggies or whole-wheat chips to  "dip.    Nuts are an excellent source of protein and healthy fats.   Tree nuts are the best kind, such as almonds or walnuts.   Just be careful - they are high in calories, so stick to a serving size.  (Most are about 200 calories for a 1/4 cup)      Proteins are very important for your body, and they also hold down your appetite.   Try to have protein with every meal.    These generally are meats, nuts, many beans, legumes, eggs, and dairy.   You will find protein in whole grain products and some green vegetables have a little too.     When you have dairy (if you can - many people are lactose intolerant) try to make it low fat.    Ideas are 1% milk, lowfat yogurt or cheeses, low fat cottage cheese.   I don't generally recommend FAT FREE because they often contain artificial products to improve taste, and the fat helps hold down your appetite.   If you are lactose intolerant, try to see if taking Lactaid before having dairy helps.      Fresh fruits and vegetables are VERY important.  The brighter the better.   Many vegetables are considered \"Free Foods\" - meaning you can eat as much as you want, and it does not matter.  These include tomatoes, cucumbers, celery, peppers, all the various lettuces and kale - to name a few.   Potatoes, corn and peas are starchy, so do have more calories, but are still healthy - you just want to watch the amount of them you eat.       Fruits are full of wonderful nutrition.   They contain natural sugar called fructose, so eating them in moderation is best.   Diabetics may need to pay careful attention to how their body reacts to the sugar.  Some fruits might drastically increase their blood sugar.      Eating smaller meals with a couple of small snacks is better for your metabolism than not eating for long amounts of time  (breakfast is very important).   Trying to avoid large meals is helpful too.    Eating like this helps keep your appetite down and keeps you in burning metabolism " rather than storage metabolism so your body will use the calories you eat.       I do not tell people to stop eating sweets or snack foods - just limit the amounts you have.  The less the better.   Pay attention to serving sizes, and treat them as a treat.        Foods like doughnuts, pop tarts, sugar cereals, cookies  ARE NOT GOOD FOR BREAKFAST.   They are loaded with sugar and will cause you to be hungrier in the day and often not feel well.    Caffeine needs to be limited - no more than 2 servings a day.  Some people can't have any at all.    (if you have any sleep or anxiety issues - stop the caffeine)   Coffee, many teas, many sodas, energy drinks, almost any diet supplement,  and chocolate all contain caffeine.      Water is important.   For most people, 8   x  8 ounces  a day are needed.  This may vary for some health issues.    If you need to be on a low sodium diet, that means looking at labels and eating only 1000 - 2000 mg of sodium a day.    Calcium intake is important.  3 servings of a high calcium food or drink a day is recommended.   This is usually a cup of milk, a cup of yogurt, an ounce of a hard cheese or 1.5 ounces of a soft cheese are the usual servings.   There are other high calcium foods - including soy or almond milk, broccoli,  almonds, dark green leafy vegetable.   Make sure you are not getting more than 1000  - 1200 mg of total calcium a day (unless you have been told you need more by a doctor).    Vitamin D 3 is important to absorb the calcium and for your immune system.   For children, 400 IU a day is recommended.   For adults - 800 - 5000 IU a day  is recommended.  (Often the amount needed is individualized for adults - be sure to ask how much is right for you)    Physical activity is very important for good health.    Finding activities that give you regular exercise is very important for good health.  Try to find exercise you enjoy doing on a regular basis.    30 minutes at least 5  days a week of a good cardiovascular exercise is recommended.   That means something that gets your heart rate going faster than your usual baseline and you can find yourself breathing harder than usual while you are exercising.  If you have not done any exercise in a long time,  make sure you ask if its safe for you to start,  and be sure to gradually work up to your goal.      If you need to lose weight,  following these recommendations will help you.   And if you are doing all of this and still not losing weight, then its likely just the amount of food you are eating.   Learn to cut back on portion sizes.  Using smaller plates may help.  Healthy weight loss is  only about a pound a week.   You have to remember that whatever you do to lose the weight, you must be prepared to keep it up for life for the weight to stay off.     A lot of people have a lot of luck with using something like a fit bit,  or a program where you keep track of all of your calories that you eat and what you burn off in the day.

## 2024-08-29 NOTE — PROGRESS NOTES
Subjective   Patient ID: Latasha Rodrigues is a 53 y.o. female who presents for  WWE      HPI     WWE - Pt filled out and I reviewed with her today the WWE form - scanned to chart   Last one:    years ago   Ovary removed      Concerns today:       Last pap:   2018 - told it was good for 5 years   LMP :   has an IUD  - periods stopped years ago   FSH  -  was 18     : 3  Para: 3    Birth Control?  :  has IUD   gyn problems? :  NONE   pelvic symptoms? :  NONE       Last mammogram:   2023   Breast issues?  :   NONE   Is there a family hx of BRCA ? :            yes - Mat aunt-  age 50   Has pt ever had breast biopsy ? :  No     Bone density assessment  Last DEXA Scan  :    too young   fracture history  :   NONE   Calcium and Vit D intake :   on D     Last colonoscopy or colon cancer screen :     Hx colon cancer - Dx 10/2020   S/p partial  sigmoid colon resection   Had colostomy 6 mos   Reversed 2021   Had chemotherapy for 6 mos   No radiation  Sees DR Rueda every 6 mos   Last colonoscopy 3/2024  - 2 years     Pt had genetic testing - NEG     Family hx of colon cancer -   GM colon cancer -  in her 80s      Last cholesterol level:     WNL     Last blood sugar level :     Prediabetes -  2024 A1C 5.8% -   tried ozempic  - felt good  -  too costly for now      Hep C screen?  :   not done   HIV screen?  :       vaccines -   FLU:    UTD - will get soon                      PNEUMOVAX:                     PREVNAR:                     COVID-19:    did get 4                      ZOSTER:   not yet                      TETNAS/PERTUSSIS:                                           OTHER:      vision -    last appt:    3 years ago     dentist -    last appt:   goes regularly     BMI/weight :   48 BMI   nutrition :    exercise :      smoking status:   former           how long and how much?  :    quit                  Only smoked 6 - 7 years   Need Screening Lung CT?:    NO     alcohol consumption:   occas      Need coronary calcium score?  :   interested     LIVING WILL/MEDICAL POA :    not yet             On chart?  :        Review of Systems    Objective   /83   Pulse 69   Wt 144 kg (317 lb 6.4 oz)   SpO2 98%   BMI 48.89 kg/m²     Physical Exam  Vitals reviewed.   Constitutional:       Appearance: Normal appearance. She is obese.   HENT:      Head: Normocephalic and atraumatic.      Right Ear: Tympanic membrane, ear canal and external ear normal.      Left Ear: Tympanic membrane, ear canal and external ear normal.      Nose: Nose normal.      Mouth/Throat:      Mouth: Mucous membranes are moist.      Pharynx: No posterior oropharyngeal erythema.   Eyes:      General: No scleral icterus.     Extraocular Movements: Extraocular movements intact.      Pupils: Pupils are equal, round, and reactive to light.   Cardiovascular:      Rate and Rhythm: Normal rate and regular rhythm.      Pulses: Normal pulses.      Heart sounds: Normal heart sounds. No murmur heard.  Pulmonary:      Effort: Pulmonary effort is normal. No respiratory distress.      Breath sounds: Normal breath sounds. No wheezing.   Chest:      Chest wall: No deformity.   Breasts:     Alex Score is 5.      Breasts are symmetrical.      Right: Normal. No mass.      Left: Normal. No mass.   Abdominal:      General: Bowel sounds are normal. There is no distension.      Palpations: Abdomen is soft.      Tenderness: There is no abdominal tenderness.   Genitourinary:     General: Normal vulva.      Exam position: Lithotomy position.      Pubic Area: No rash.       Labia:         Right: No lesion.         Left: No lesion.       Vagina: Normal. No vaginal discharge.      Cervix: No cervical motion tenderness, friability, erythema or eversion.      Uterus: Normal.       Adnexa: Right adnexa normal and left adnexa normal.   Musculoskeletal:         General: Normal range of motion.      Cervical back: Neck supple. No rigidity.      Right lower leg: No edema.       Left lower leg: No edema.   Lymphadenopathy:      Cervical: No cervical adenopathy.      Upper Body:      Right upper body: No axillary adenopathy.      Left upper body: No axillary adenopathy.   Skin:     General: Skin is warm and dry.      Findings: No rash.   Neurological:      General: No focal deficit present.      Mental Status: She is alert and oriented to person, place, and time.   Psychiatric:         Mood and Affect: Mood normal.         Thought Content: Thought content normal.         Assessment/Plan   Problem List Items Addressed This Visit             ICD-10-CM       High    Colon cancer (Multi) C18.9     Other Visit Diagnoses         Codes    Encounter for annual routine gynecological examination    -  Primary Z01.419    Screening mammogram, encounter for     Z12.31    Relevant Orders    BI mammo bilateral screening tomosynthesis    Screening for cardiovascular condition     Z13.6    Relevant Orders    CT cardiac scoring wo IV contrast    Encounter for cervical Pap smear with pelvic exam     Z01.419    Relevant Orders    THINPREP PAP TEST          WWE today -   Breast exam , pap/pelvic done     Appt 6 mos - HTN     We discussed at visit any disease processes that were of concern as well as the risks, benefits and instructions of any new medication provided.    See orders and discussion section for information handed to patient on their Clinical Summary.   Patient (and/or caretaker of patient if present)  stated all questions were answered, and they voiced understanding of instructions.

## 2024-09-06 ENCOUNTER — APPOINTMENT (OUTPATIENT)
Dept: RADIOLOGY | Facility: HOSPITAL | Age: 54
End: 2024-09-06
Payer: COMMERCIAL

## 2024-09-06 LAB
CYTOLOGY CMNT CVX/VAG CYTO-IMP: NORMAL
HPV HR 12 DNA GENITAL QL NAA+PROBE: NEGATIVE
HPV HR GENOTYPES PNL CVX NAA+PROBE: NEGATIVE
HPV16 DNA SPEC QL NAA+PROBE: NEGATIVE
HPV18 DNA SPEC QL NAA+PROBE: NEGATIVE
LAB AP CONTRACEPTIVE HISTORY: NORMAL
LAB AP HPV GENOTYPE QUESTION: YES
LAB AP HPV HR: NORMAL
LABORATORY COMMENT REPORT: NORMAL
LMP START DATE: NORMAL
PATH REPORT.TOTAL CANCER: NORMAL

## 2024-09-18 ENCOUNTER — HOSPITAL ENCOUNTER (OUTPATIENT)
Dept: RADIOLOGY | Facility: HOSPITAL | Age: 54
Discharge: HOME | End: 2024-09-18
Payer: COMMERCIAL

## 2024-09-18 DIAGNOSIS — Z13.6 SCREENING FOR CARDIOVASCULAR CONDITION: ICD-10-CM

## 2024-09-18 PROCEDURE — 75571 CT HRT W/O DYE W/CA TEST: CPT

## 2024-09-19 ENCOUNTER — APPOINTMENT (OUTPATIENT)
Dept: RADIOLOGY | Facility: HOSPITAL | Age: 54
End: 2024-09-19
Payer: COMMERCIAL

## 2024-09-20 ENCOUNTER — APPOINTMENT (OUTPATIENT)
Dept: RADIOLOGY | Facility: HOSPITAL | Age: 54
End: 2024-09-20
Payer: COMMERCIAL

## 2024-09-20 ENCOUNTER — LAB (OUTPATIENT)
Dept: LAB | Facility: HOSPITAL | Age: 54
End: 2024-09-20
Payer: COMMERCIAL

## 2024-09-20 VITALS — BODY MASS INDEX: 43.95 KG/M2 | HEIGHT: 68 IN | WEIGHT: 290 LBS

## 2024-09-20 DIAGNOSIS — C18.9 MALIGNANT NEOPLASM OF COLON, UNSPECIFIED PART OF COLON (MULTI): ICD-10-CM

## 2024-09-20 DIAGNOSIS — Z12.31 SCREENING MAMMOGRAM, ENCOUNTER FOR: ICD-10-CM

## 2024-09-20 PROCEDURE — 77067 SCR MAMMO BI INCL CAD: CPT | Performed by: RADIOLOGY

## 2024-09-20 PROCEDURE — 36415 COLL VENOUS BLD VENIPUNCTURE: CPT

## 2024-09-20 PROCEDURE — 77067 SCR MAMMO BI INCL CAD: CPT

## 2024-09-20 PROCEDURE — 77063 BREAST TOMOSYNTHESIS BI: CPT | Performed by: RADIOLOGY

## 2024-10-02 LAB
COMMENTS - MP RESULT TYPE: NORMAL
SCAN RESULT: NORMAL

## 2024-12-05 ENCOUNTER — APPOINTMENT (OUTPATIENT)
Dept: GASTROENTEROLOGY | Facility: CLINIC | Age: 54
End: 2024-12-05
Payer: COMMERCIAL

## 2025-01-17 ENCOUNTER — LAB (OUTPATIENT)
Dept: LAB | Facility: HOSPITAL | Age: 55
End: 2025-01-17
Payer: COMMERCIAL

## 2025-01-17 DIAGNOSIS — C18.9 MALIGNANT NEOPLASM OF COLON, UNSPECIFIED PART OF COLON (MULTI): ICD-10-CM

## 2025-01-17 PROCEDURE — 9990000009 MISCELLANEOUS GENETICS TEST

## 2025-01-17 PROCEDURE — 36415 COLL VENOUS BLD VENIPUNCTURE: CPT

## 2025-01-31 DIAGNOSIS — C18.9 MALIGNANT NEOPLASM OF COLON, UNSPECIFIED PART OF COLON (MULTI): ICD-10-CM

## 2025-01-31 LAB
COMMENTS - MP RESULT TYPE: NORMAL
SCAN RESULT: NORMAL

## 2025-02-07 ENCOUNTER — LAB (OUTPATIENT)
Dept: LAB | Facility: HOSPITAL | Age: 55
End: 2025-02-07
Payer: COMMERCIAL

## 2025-02-07 DIAGNOSIS — C18.9 MALIGNANT NEOPLASM OF COLON, UNSPECIFIED PART OF COLON (MULTI): ICD-10-CM

## 2025-02-07 LAB
ALBUMIN SERPL BCP-MCNC: 4.3 G/DL (ref 3.4–5)
ALP SERPL-CCNC: 67 U/L (ref 33–110)
ALT SERPL W P-5'-P-CCNC: 28 U/L (ref 7–45)
ANION GAP SERPL CALC-SCNC: 13 MMOL/L (ref 10–20)
AST SERPL W P-5'-P-CCNC: 20 U/L (ref 9–39)
BILIRUB SERPL-MCNC: 0.6 MG/DL (ref 0–1.2)
BUN SERPL-MCNC: 14 MG/DL (ref 6–23)
CALCIUM SERPL-MCNC: 10 MG/DL (ref 8.6–10.3)
CHLORIDE SERPL-SCNC: 102 MMOL/L (ref 98–107)
CO2 SERPL-SCNC: 27 MMOL/L (ref 21–32)
CREAT SERPL-MCNC: 0.72 MG/DL (ref 0.5–1.05)
EGFRCR SERPLBLD CKD-EPI 2021: >90 ML/MIN/1.73M*2
GLUCOSE SERPL-MCNC: 99 MG/DL (ref 74–99)
POTASSIUM SERPL-SCNC: 4.2 MMOL/L (ref 3.5–5.3)
PROT SERPL-MCNC: 7.7 G/DL (ref 6.4–8.2)
SODIUM SERPL-SCNC: 138 MMOL/L (ref 136–145)

## 2025-02-07 PROCEDURE — 84075 ASSAY ALKALINE PHOSPHATASE: CPT

## 2025-02-07 PROCEDURE — 36415 COLL VENOUS BLD VENIPUNCTURE: CPT

## 2025-02-13 ENCOUNTER — HOSPITAL ENCOUNTER (OUTPATIENT)
Dept: RADIOLOGY | Facility: HOSPITAL | Age: 55
End: 2025-02-13
Payer: COMMERCIAL

## 2025-02-13 ENCOUNTER — HOSPITAL ENCOUNTER (OUTPATIENT)
Dept: RADIOLOGY | Facility: HOSPITAL | Age: 55
Discharge: HOME | End: 2025-02-13
Payer: COMMERCIAL

## 2025-02-13 ENCOUNTER — APPOINTMENT (OUTPATIENT)
Dept: GASTROENTEROLOGY | Facility: CLINIC | Age: 55
End: 2025-02-13
Payer: COMMERCIAL

## 2025-02-13 DIAGNOSIS — C18.9 MALIGNANT NEOPLASM OF COLON, UNSPECIFIED PART OF COLON (MULTI): ICD-10-CM

## 2025-02-13 PROCEDURE — 2550000001 HC RX 255 CONTRASTS: Performed by: INTERNAL MEDICINE

## 2025-02-13 PROCEDURE — 74177 CT ABD & PELVIS W/CONTRAST: CPT

## 2025-02-13 PROCEDURE — A9698 NON-RAD CONTRAST MATERIALNOC: HCPCS | Performed by: INTERNAL MEDICINE

## 2025-02-13 RX ADMIN — IOHEXOL 75 ML: 350 INJECTION, SOLUTION INTRAVENOUS at 10:16

## 2025-02-13 RX ADMIN — IOHEXOL 500 ML: 9 SOLUTION ORAL at 10:23

## 2025-02-20 ENCOUNTER — APPOINTMENT (OUTPATIENT)
Dept: HEMATOLOGY/ONCOLOGY | Facility: CLINIC | Age: 55
End: 2025-02-20
Payer: COMMERCIAL

## 2025-02-28 ENCOUNTER — OFFICE VISIT (OUTPATIENT)
Dept: HEMATOLOGY/ONCOLOGY | Facility: CLINIC | Age: 55
End: 2025-02-28
Payer: COMMERCIAL

## 2025-02-28 VITALS
SYSTOLIC BLOOD PRESSURE: 121 MMHG | BODY MASS INDEX: 48.44 KG/M2 | WEIGHT: 293 LBS | RESPIRATION RATE: 17 BRPM | HEART RATE: 71 BPM | OXYGEN SATURATION: 94 % | DIASTOLIC BLOOD PRESSURE: 77 MMHG | TEMPERATURE: 97.9 F

## 2025-02-28 DIAGNOSIS — C18.9 MALIGNANT NEOPLASM OF COLON, UNSPECIFIED PART OF COLON (MULTI): ICD-10-CM

## 2025-02-28 PROCEDURE — 99214 OFFICE O/P EST MOD 30 MIN: CPT | Performed by: INTERNAL MEDICINE

## 2025-02-28 PROCEDURE — 3078F DIAST BP <80 MM HG: CPT | Performed by: INTERNAL MEDICINE

## 2025-02-28 PROCEDURE — 3074F SYST BP LT 130 MM HG: CPT | Performed by: INTERNAL MEDICINE

## 2025-02-28 ASSESSMENT — PATIENT HEALTH QUESTIONNAIRE - PHQ9
1. LITTLE INTEREST OR PLEASURE IN DOING THINGS: NOT AT ALL
SUM OF ALL RESPONSES TO PHQ9 QUESTIONS 1 AND 2: 0
2. FEELING DOWN, DEPRESSED OR HOPELESS: NOT AT ALL

## 2025-02-28 ASSESSMENT — ENCOUNTER SYMPTOMS
CONSTITUTIONAL NEGATIVE: 1
CARDIOVASCULAR NEGATIVE: 1
RESPIRATORY NEGATIVE: 1
GASTROINTESTINAL NEGATIVE: 1

## 2025-02-28 ASSESSMENT — PAIN SCALES - GENERAL: PAINLEVEL_OUTOF10: 0-NO PAIN

## 2025-02-28 ASSESSMENT — COLUMBIA-SUICIDE SEVERITY RATING SCALE - C-SSRS
2. HAVE YOU ACTUALLY HAD ANY THOUGHTS OF KILLING YOURSELF?: NO
1. IN THE PAST MONTH, HAVE YOU WISHED YOU WERE DEAD OR WISHED YOU COULD GO TO SLEEP AND NOT WAKE UP?: NO
6. HAVE YOU EVER DONE ANYTHING, STARTED TO DO ANYTHING, OR PREPARED TO DO ANYTHING TO END YOUR LIFE?: NO

## 2025-02-28 NOTE — PROGRESS NOTES
Medications, pharmacy preference and allergies were reviewed with patient and updated  Patient verbalized understanding and agreement regarding discussed information via verbal feedback.  Education Documentation  Diagnostic Studies, taught by Mirna Baez RN at 2/28/2025 11:23 AM.  Learner: Patient  Readiness: Acceptance  Method: Explanation  Response: Verbalizes Understanding    Tumor Markers, taught by Mirna Baez RN at 2/28/2025 11:23 AM.  Learner: Patient  Readiness: Acceptance  Method: Explanation  Response: Verbalizes Understanding    Comprehensive Metabolic Panel (CMP), taught by Mirna Baez RN at 2/28/2025 11:23 AM.  Learner: Patient  Readiness: Acceptance  Method: Explanation  Response: Verbalizes Understanding    Complete Blood Count with Differential (CBC w/ Diff), taught by Mirna Baez RN at 2/28/2025 11:23 AM.  Learner: Patient  Readiness: Acceptance  Method: Explanation  Response: Verbalizes Understanding    Education Comments  No comments found.

## 2025-02-28 NOTE — PROGRESS NOTES
Patient ID: Latasha Rodrigues is a 54 y.o. female.  Referring Physician: Pat Rueda MD  [id number] [place] [address]  Primary Care Provider: Joseline Salas MD  Visit Type:  Follow Up     Subjective    HPI  HPI  IMPRESSION:  1.  No evidence of recurrent malignancy acute findings. No  significant interval change from the previous exam.     History of Present Illness:      ID Statement:    LATASHA RODRIGUES is a 52 year old Female        Interval History:    Patient presents for follow up. She c/o intermittent neuropathy. Otherwise doing well. Denies fever, chills, night sweats, anorexia, weight loss, CP, SOB, abd pain,  N/V/D/C, bleeding, and any other complaints at this time.      No complaints.  Episodic pain in the lower abdomen.  Not deemed pathological but probably secondary to the effects of a colectomy or hemicolectomy.  No specific complaints.  She is concerned about her weight which she is gaining and is considering seeking  medical and surgical attention in regard to her weight.  His CEA and CT abdomen and pelvis is negative for any evidence of disease progression.   Review of Systems - Oncology   Review of Systems   Constitutional: Negative.    HENT:  Negative.     Respiratory: Negative.     Cardiovascular: Negative.    Gastrointestinal: Negative.         Objective   BSA: 2.64 meters squared  /77 (BP Location: Left arm, Patient Position: Sitting, BP Cuff Size: Large adult long)   Pulse 71   Temp 36.6 °C (97.9 °F) (Temporal)   Resp 17   Wt 145 kg (318 lb 9 oz)   SpO2 94%   BMI 48.44 kg/m²      has a past medical history of Acute urinary tract infection (04/03/2024), Allergic rhinitis (04/03/2024), Cancer of sigmoid colon (Multi), Class 3 severe obesity with body mass index (BMI) of 45.0 to 49.9 in adult (Multi) (01/18/2024), Colon cancer (Multi) (Oct 2020), Colon polyp (Oct 2020), Constipation (04/03/2024), Diseases of lips (12/28/2015), Hepatic steatosis,  Hypertension, Left sided abdominal pain (04/03/2024), Morbid (severe) obesity due to excess calories (Multi) (09/09/2019), Personal history of other diseases of the digestive system (05/13/2015), Personal history of other diseases of the female genital tract (04/16/2015), Personal history of other specified conditions (10/16/2020), Portal vein thrombosis (12/01/2021), Right upper quadrant pain (04/16/2015), and Unspecified open wound of abdominal wall, unspecified quadrant without penetration into peritoneal cavity, initial encounter (12/01/2021).   has a past surgical history that includes Other surgical history (06/01/2022); Other surgical history (10/07/2020); Other surgical history (05/13/2015); US guided abscess drain (10/28/2020); Cholecystectomy (2015); Colon surgery (Oct 2020); and Colostomy (Oct 2020).  Family History   Problem Relation Name Age of Onset    Other (carcinoma) Mother      Liver disease Father Don     Breast cancer Mother's Sister      Colon cancer Maternal Grandmother Raquel      Oncology History    No history exists.       Latasha Rodrigues  reports that she has quit smoking. She has never used smokeless tobacco.  She  reports current alcohol use of about 5.0 standard drinks of alcohol per week.  She  reports no history of drug use.    Physical Exam  Constitutional:       Appearance: Normal appearance.   HENT:      Head: Normocephalic and atraumatic.   Eyes:      Extraocular Movements: Extraocular movements intact.      Pupils: Pupils are equal, round, and reactive to light.   Neurological:      Mental Status: She is alert.         WBC   Date/Time Value Ref Range Status   07/12/2024 11:38 AM 9.5 4.4 - 11.3 x10*3/uL Final   04/25/2024 08:39 AM 8.1 4.4 - 11.3 x10*3/uL Final   07/14/2023 08:04 AM 8.4 4.4 - 11.3 x10E9/L Final   01/13/2023 08:37 AM 7.3 4.4 - 11.3 x10E9/L Final   07/12/2022 10:30 AM 9.0 4.4 - 11.3 x10E9/L Final     nRBC   Date Value Ref Range Status   04/25/2024 0.0 0.0 - 0.0 /100 WBCs  Final   03/11/2019 0.0 0.0 - 0.0 /100 WBC Final     RBC   Date Value Ref Range Status   07/12/2024 4.77 4.00 - 5.20 x10*6/uL Final   04/25/2024 4.76 4.00 - 5.20 x10*6/uL Final   07/14/2023 4.71 4.00 - 5.20 x10E12/L Final   01/13/2023 4.71 4.00 - 5.20 x10E12/L Final   07/12/2022 4.81 4.00 - 5.20 x10E12/L Final     Hemoglobin   Date Value Ref Range Status   07/12/2024 14.5 12.0 - 16.0 g/dL Final   04/25/2024 14.6 12.0 - 16.0 g/dL Final   07/14/2023 14.4 12.0 - 16.0 g/dL Final   01/13/2023 14.2 12.0 - 16.0 g/dL Final   07/12/2022 14.4 12.0 - 16.0 g/dL Final     Hematocrit   Date Value Ref Range Status   07/12/2024 44.3 36.0 - 46.0 % Final   04/25/2024 44.6 36.0 - 46.0 % Final   07/14/2023 43.9 36.0 - 46.0 % Final   01/13/2023 42.8 36.0 - 46.0 % Final   07/12/2022 44.8 36.0 - 46.0 % Final     MCV   Date/Time Value Ref Range Status   07/12/2024 11:38 AM 93 80 - 100 fL Final   04/25/2024 08:39 AM 94 80 - 100 fL Final   07/14/2023 08:04 AM 93 80 - 100 fL Final   01/13/2023 08:37 AM 91 80 - 100 fL Final   07/12/2022 10:30 AM 93 80 - 100 fL Final     MCH   Date/Time Value Ref Range Status   07/12/2024 11:38 AM 30.4 26.0 - 34.0 pg Final   04/25/2024 08:39 AM 30.7 26.0 - 34.0 pg Final     MCHC   Date/Time Value Ref Range Status   07/12/2024 11:38 AM 32.7 32.0 - 36.0 g/dL Final   04/25/2024 08:39 AM 32.7 32.0 - 36.0 g/dL Final   07/14/2023 08:04 AM 32.8 32.0 - 36.0 g/dL Final   01/13/2023 08:37 AM 33.2 32.0 - 36.0 g/dL Final   07/12/2022 10:30 AM 32.1 32.0 - 36.0 g/dL Final     RDW   Date/Time Value Ref Range Status   07/12/2024 11:38 AM 13.0 11.5 - 14.5 % Final   04/25/2024 08:39 AM 13.3 11.5 - 14.5 % Final   07/14/2023 08:04 AM 12.8 11.5 - 14.5 % Final   01/13/2023 08:37 AM 13.1 11.5 - 14.5 % Final   07/12/2022 10:30 AM 13.3 11.5 - 14.5 % Final     Platelets   Date/Time Value Ref Range Status   07/12/2024 11:38  150 - 450 x10*3/uL Final   04/25/2024 08:39  150 - 450 x10*3/uL Final   07/14/2023 08:04  150 -  "450 x10E9/L Final   01/13/2023 08:37  150 - 450 x10E9/L Final   07/12/2022 10:30  150 - 450 x10E9/L Final     No results found for: \"MPV\"  Neutrophils %   Date/Time Value Ref Range Status   07/12/2024 11:38 AM 70.8 40.0 - 80.0 % Final   04/25/2024 08:39 AM 64.0 40.0 - 80.0 % Final   07/14/2023 08:04 AM 65.5 40.0 - 80.0 % Final   01/13/2023 08:37 AM 59.0 40.0 - 80.0 % Final   07/12/2022 10:30 AM 69.1 40.0 - 80.0 % Final     Immature Granulocytes %, Automated   Date/Time Value Ref Range Status   07/12/2024 11:38 AM 0.1 0.0 - 0.9 % Final     Comment:     Immature Granulocyte Count (IG) includes promyelocytes, myelocytes and metamyelocytes but does not include bands. Percent differential counts (%) should be interpreted in the context of the absolute cell counts (cells/UL).   04/25/2024 08:39 AM 0.2 0.0 - 0.9 % Final     Comment:     Immature Granulocyte Count (IG) includes promyelocytes, myelocytes and metamyelocytes but does not include bands. Percent differential counts (%) should be interpreted in the context of the absolute cell counts (cells/UL).   07/14/2023 08:04 AM 0.2 0.0 - 0.9 % Final     Comment:      Immature Granulocyte Count (IG) includes promyelocytes,    myelocytes and metamyelocytes but does not include bands.   Percent differential counts (%) should be interpreted in the   context of the absolute cell counts (cells/L).     01/13/2023 08:37 AM 0.1 0.0 - 0.9 % Final     Comment:      Immature Granulocyte Count (IG) includes promyelocytes,    myelocytes and metamyelocytes but does not include bands.   Percent differential counts (%) should be interpreted in the   context of the absolute cell counts (cells/L).     07/12/2022 10:30 AM 0.2 0.0 - 0.9 % Final     Comment:      Immature Granulocyte Count (IG) includes promyelocytes,    myelocytes and metamyelocytes but does not include bands.   Percent differential counts (%) should be interpreted in the   context of the absolute cell counts " (cells/L).       Lymphocytes %   Date/Time Value Ref Range Status   07/12/2024 11:38 AM 22.9 13.0 - 44.0 % Final   04/25/2024 08:39 AM 27.4 13.0 - 44.0 % Final   07/14/2023 08:04 AM 27.4 13.0 - 44.0 % Final     Comment:      Percent differential counts (%) should be interpreted in the   context of the absolute cell counts (cells/L).     01/13/2023 08:37 AM 32.5 13.0 - 44.0 % Final     Comment:      Percent differential counts (%) should be interpreted in the   context of the absolute cell counts (cells/L).     07/12/2022 10:30 AM 24.4 13.0 - 44.0 % Final     Comment:      Percent differential counts (%) should be interpreted in the   context of the absolute cell counts (cells/L).       Monocytes %   Date/Time Value Ref Range Status   07/12/2024 11:38 AM 5.2 2.0 - 10.0 % Final   04/25/2024 08:39 AM 6.6 2.0 - 10.0 % Final   07/14/2023 08:04 AM 5.4 2.0 - 10.0 % Final   01/13/2023 08:37 AM 6.4 2.0 - 10.0 % Final   07/12/2022 10:30 AM 5.0 2.0 - 10.0 % Final     Eosinophils %   Date/Time Value Ref Range Status   07/12/2024 11:38 AM 0.7 0.0 - 6.0 % Final   04/25/2024 08:39 AM 1.2 0.0 - 6.0 % Final   07/14/2023 08:04 AM 1.0 0.0 - 6.0 % Final   01/13/2023 08:37 AM 1.2 0.0 - 6.0 % Final   07/12/2022 10:30 AM 1.0 0.0 - 6.0 % Final     Basophils %   Date/Time Value Ref Range Status   07/12/2024 11:38 AM 0.3 0.0 - 2.0 % Final   04/25/2024 08:39 AM 0.6 0.0 - 2.0 % Final   07/14/2023 08:04 AM 0.5 0.0 - 2.0 % Final   01/13/2023 08:37 AM 0.8 0.0 - 2.0 % Final   07/12/2022 10:30 AM 0.3 0.0 - 2.0 % Final     Neutrophils Absolute   Date/Time Value Ref Range Status   07/12/2024 11:38 AM 6.71 1.20 - 7.70 x10*3/uL Final     Comment:     Percent differential counts (%) should be interpreted in the context of the absolute cell counts (cells/uL).   04/25/2024 08:39 AM 5.15 1.20 - 7.70 x10*3/uL Final     Comment:     Percent differential counts (%) should be interpreted in the context of the absolute cell counts (cells/uL).   07/14/2023 08:04  "AM 5.50 1.20 - 7.70 x10E9/L Final   01/13/2023 08:37 AM 4.31 1.20 - 7.70 x10E9/L Final   07/12/2022 10:30 AM 6.22 1.20 - 7.70 x10E9/L Final     Immature Granulocytes Absolute, Automated   Date/Time Value Ref Range Status   07/12/2024 11:38 AM 0.01 0.00 - 0.70 x10*3/uL Final   04/25/2024 08:39 AM 0.02 0.00 - 0.70 x10*3/uL Final     Lymphocytes Absolute   Date/Time Value Ref Range Status   07/12/2024 11:38 AM 2.17 1.20 - 4.80 x10*3/uL Final   04/25/2024 08:39 AM 2.21 1.20 - 4.80 x10*3/uL Final   07/14/2023 08:04 AM 2.30 1.20 - 4.80 x10E9/L Final   01/13/2023 08:37 AM 2.37 1.20 - 4.80 x10E9/L Final   07/12/2022 10:30 AM 2.20 1.20 - 4.80 x10E9/L Final     Monocytes Absolute   Date/Time Value Ref Range Status   07/12/2024 11:38 AM 0.49 0.10 - 1.00 x10*3/uL Final   04/25/2024 08:39 AM 0.53 0.10 - 1.00 x10*3/uL Final   07/14/2023 08:04 AM 0.45 0.10 - 1.00 x10E9/L Final   01/13/2023 08:37 AM 0.47 0.10 - 1.00 x10E9/L Final   07/12/2022 10:30 AM 0.45 0.10 - 1.00 x10E9/L Final     Eosinophils Absolute   Date/Time Value Ref Range Status   07/12/2024 11:38 AM 0.07 0.00 - 0.70 x10*3/uL Final   04/25/2024 08:39 AM 0.10 0.00 - 0.70 x10*3/uL Final   07/14/2023 08:04 AM 0.08 0.00 - 0.70 x10E9/L Final   01/13/2023 08:37 AM 0.09 0.00 - 0.70 x10E9/L Final   07/12/2022 10:30 AM 0.09 0.00 - 0.70 x10E9/L Final     Basophils Absolute   Date/Time Value Ref Range Status   07/12/2024 11:38 AM 0.03 0.00 - 0.10 x10*3/uL Final   04/25/2024 08:39 AM 0.05 0.00 - 0.10 x10*3/uL Final   07/14/2023 08:04 AM 0.04 0.00 - 0.10 x10E9/L Final   01/13/2023 08:37 AM 0.06 0.00 - 0.10 x10E9/L Final   07/12/2022 10:30 AM 0.03 0.00 - 0.10 x10E9/L Final       No components found for: \"PT\"  aPTT   Date/Time Value Ref Range Status   05/31/2022 01:49 PM 37 26 - 39 sec Final     Comment:       THE APTT IS NO LONGER USED FOR MONITORING     UNFRACTIONATED HEPARIN THERAPY.    FOR MONITORING HEPARIN THERAPY,     USE THE HEPARIN ASSAY.         Assessment/Plan      s/p " "hemicolectomy and colostomy for a Stage IIB sigmoid colon cancer, recovering well. 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 However cycle 2 was c/b severe full body/nerve pain lasting over a week.      repeat CT C/A/P in 6 months. monitor CBCD, CMP, and CEA: f/u with genetics     Hx of portal vein thrombosis s/p Eliquis for a total of 6 months due to need for adjuvant chemotherapy and thrombosis risk from chemotherapy. Thrombophilia work up is negative. ok to discontinue eliquis.      RTC in 6 moths with CT C/A/P and labs. Switch her to 6 monthly thereafter.   CT CAP: SKELETON: Degenerative changes. No acute process.      IMPRESSION:  No CT evidence of an acute process within the abdomen or pelvis.  Unchanged findings as detailed above.    2/28/2025: Status post 5 years since therapy and surgery.  Status post adjuvant chemotherapy.  Has been had [employer] monitoring with negative evidence of minimal residual disease.  Will complete 5 years in October.  Appointment in November to evaluate whether to discharge from\" follow-up.     Diagnoses and all orders for this visit:  Malignant neoplasm of colon, unspecified part of colon (Multi)  -     Clinic Appointment Request Follow Up           Pat Rueda MD                         "

## 2025-03-03 ENCOUNTER — APPOINTMENT (OUTPATIENT)
Dept: PRIMARY CARE | Facility: CLINIC | Age: 55
End: 2025-03-03
Payer: COMMERCIAL

## 2025-03-03 VITALS
OXYGEN SATURATION: 98 % | BODY MASS INDEX: 49.57 KG/M2 | WEIGHT: 293 LBS | HEART RATE: 72 BPM | DIASTOLIC BLOOD PRESSURE: 82 MMHG | SYSTOLIC BLOOD PRESSURE: 130 MMHG

## 2025-03-03 DIAGNOSIS — R73.03 PREDIABETES: ICD-10-CM

## 2025-03-03 DIAGNOSIS — E66.813 CLASS 3 SEVERE OBESITY DUE TO EXCESS CALORIES WITH SERIOUS COMORBIDITY AND BODY MASS INDEX (BMI) OF 45.0 TO 49.9 IN ADULT: ICD-10-CM

## 2025-03-03 DIAGNOSIS — I10 BENIGN ESSENTIAL HYPERTENSION: Primary | ICD-10-CM

## 2025-03-03 DIAGNOSIS — E66.01 CLASS 3 SEVERE OBESITY DUE TO EXCESS CALORIES WITH SERIOUS COMORBIDITY AND BODY MASS INDEX (BMI) OF 45.0 TO 49.9 IN ADULT: ICD-10-CM

## 2025-03-03 LAB — POC HEMOGLOBIN A1C: 5.7 % (ref 4.2–6.5)

## 2025-03-03 PROCEDURE — 99214 OFFICE O/P EST MOD 30 MIN: CPT | Performed by: FAMILY MEDICINE

## 2025-03-03 PROCEDURE — 83036 HEMOGLOBIN GLYCOSYLATED A1C: CPT | Performed by: FAMILY MEDICINE

## 2025-03-03 PROCEDURE — 3075F SYST BP GE 130 - 139MM HG: CPT | Performed by: FAMILY MEDICINE

## 2025-03-03 PROCEDURE — 3079F DIAST BP 80-89 MM HG: CPT | Performed by: FAMILY MEDICINE

## 2025-03-03 PROCEDURE — 1036F TOBACCO NON-USER: CPT | Performed by: FAMILY MEDICINE

## 2025-03-03 RX ORDER — LISINOPRIL AND HYDROCHLOROTHIAZIDE 10; 12.5 MG/1; MG/1
1 TABLET ORAL DAILY
Qty: 90 TABLET | Refills: 3 | Status: SHIPPED | OUTPATIENT
Start: 2025-03-03

## 2025-03-03 ASSESSMENT — PATIENT HEALTH QUESTIONNAIRE - PHQ9
SUM OF ALL RESPONSES TO PHQ9 QUESTIONS 1 AND 2: 0
1. LITTLE INTEREST OR PLEASURE IN DOING THINGS: NOT AT ALL
2. FEELING DOWN, DEPRESSED OR HOPELESS: NOT AT ALL

## 2025-03-03 ASSESSMENT — ENCOUNTER SYMPTOMS
SHORTNESS OF BREATH: 0
NECK PAIN: 0
HEADACHES: 0
PALPITATIONS: 0
HYPERTENSION: 1

## 2025-03-03 NOTE — PROGRESS NOTES
4  Subjective   Patient ID: Latasha Rodrigues is a 54 y.o. female who presents for Follow-up (6 month check up.).    HPI     LOV - WWW in 2024       Updates and Concerns:     Alfie will be in [employer] class     Feels tired often     Hands and feet burn since was on Chemo          Chronic issues reviewed today  -     HTN -   On Lisinopril /hydrochlorothiazide 10-12.5 mg daily   Home BPs  120- 130/70 -80  usually   No side effects   Takes daily     Prediabetes/obesity -   2024 - A1C   5.8%   Today - 5.7%   Has battled with her weight her whole life   Checked into GLP -1 agonists  -   Not covered   Tried ozempic samples   - felt very good on it   Would like RX to compounded if sugars are under control       Nutrition - working on it   - tries to watch it ,  diabetic     Exercise -walking a few times a week      Fatty liver -    Dad  of liver failure   Liver scan 24 -   Stage 01 - 2 disease       Hx colon cancer -   Dx 10/2020 -   S/p partial  sigmoid colon resection   Had colostomy 6 mos   Reversed 2021   Had chemotherapy for 6 mos   No radiation  Sees DR Rueda every 6 mos   Last colonoscopy 3/2024     Trouble staying asleep  -   Grandson visits - he gets up   Not very tired in the day       WAC -   WWE 2024 with me   Had a ovary taken out about   Last pap  - WNL   Has an IUD   Periods stopped years ago   Mammogram - gets that yearly  -   Done 2024     CRC - see above       Vaccines - UTD per pt   Flu - UTD  Shingrix - x 2 done  COVID -  did get 4 of them   Tdap - about        3 kids -   Oldest has a son now   Taisha in [place] - going to [facility] [employer] [facility] [employer]     Ambulatory oncology EMR liaison  Does not love it   Would like to get more clinical             Review of Systems   Respiratory:  Negative for shortness of breath.    Cardiovascular:  Negative for chest pain and palpitations.   Musculoskeletal:  Negative for neck pain.   Neurological:  Negative for headaches.        Objective   /82 (BP Location: Left arm, Patient Position: Sitting, BP Cuff Size: Large adult)   Pulse 72   Wt 148 kg (326 lb)   SpO2 98%   BMI 49.57 kg/m²     Physical Exam  Vitals reviewed.   Constitutional:       General: She is not in acute distress.     Appearance: Normal appearance.   HENT:      Head: Normocephalic and atraumatic.      Nose: Nose normal.      Mouth/Throat:      Mouth: Mucous membranes are moist.      Pharynx: No posterior oropharyngeal erythema.   Eyes:      Extraocular Movements: Extraocular movements intact.      Conjunctiva/sclera: Conjunctivae normal.      Pupils: Pupils are equal, round, and reactive to light.   Cardiovascular:      Rate and Rhythm: Normal rate and regular rhythm.      Heart sounds: Normal heart sounds. No murmur heard.  Pulmonary:      Effort: Pulmonary effort is normal. No respiratory distress.      Breath sounds: Normal breath sounds. No wheezing.   Musculoskeletal:      Cervical back: No rigidity.   Lymphadenopathy:      Cervical: No cervical adenopathy.   Skin:     General: Skin is warm and dry.      Findings: No rash.   Neurological:      General: No focal deficit present.      Mental Status: She is alert. Mental status is at baseline.   Psychiatric:         Mood and Affect: Mood normal.         Thought Content: Thought content normal.         Assessment/Plan   Problem List Items Addressed This Visit             ICD-10-CM       High    Benign essential hypertension - Primary I10    Relevant Medications    lisinopriL-hydrochlorothiazide 10-12.5 mg tablet       Medium    Obesity E66.9     Other Visit Diagnoses         Codes    Prediabetes     R73.03    Relevant Orders    POCT glycosylated hemoglobin (Hb A1C) manually resulted (Completed)            Generally doing well -   HTN controlled     Sees oncology regularly for hx of colon cancer     Morbid obesity - struggled with wt for years - would like to try GLP-1 agonist - agreed to try compounded  -  yariel send to [facility]     I did have a discussion with patient that this is a compounded medication, not from the name brand company.   I do not have a 100% guarantee that this is the correct formulation ,  but I did speak to the pharmacist at [facility] and she did tell me that she is ordering from a company in the [place] that is FDA approved and they get a certificate of authenticity with it.    Pt voiced understanding.   We both agree she is doing very well  - and she wishes to continue the medication.        Can message me on My chart with updates     WWE  6 mos     We discussed at visit any disease processes that were of concern as well as the risks, benefits and instructions of any new medication provided.    See orders and discussion section for information handed to patient on their Clinical Summary.   Patient (and/or caretaker of patient if present)  stated all questions were answered, and they voiced understanding of instructions.

## 2025-03-03 NOTE — PATIENT INSTRUCTIONS
"I will send a prescription of semaglutide  (generic ozempic) to [facility] in [place]   They can mail you the medication.   Call them to set up the payment and delivery.   [phone]    The Ozempic dose can be increased every 4 weeks.   Start the 0.25 mg a week for 4 weeks.  If you are tolerating that dose fine , then you can increase to the 0.5 mg a week.     Stay on that dose until your appointment.     If you start to have side effects like nausea, severe upset stomach, constipation -   Then we just keep you on the lower dose that you tolerated.     Eating small amounts of food through the day with regular amounts of protein (dairy, nuts, meats, eggs, legumes) will help you tolerate this medication better.     For constipation issues -  over the counter meds like Mirilax, or colace or senna or fiber usually help greatly.             For General Healthy Nutrition    (Remember - NOT A DIET!   Diets are only good for class reunions.)    These are my general good nutrition recommendations for most people.   I use the term \" diet \"  in these instructions to mean your overall nutrition - how you eat and drink.   If we talked about something different during your visit with me,  other than what is written below,  follow that advice instead.       For most people,  eating healthier means getting less added sugar and less processed foods in your diet    The fresher the better.    Added sugar is now a part of the nutrition label on manufactured food, so you can keep an eye on it easier.    But basically,  foods and beverages  that contain regular sugar and corn syrup are the main sources of added sugars.  Eating as little of these foods as you can is best.   One shocking example of the epidemic of added sugar is soda.    One can of regular soda contains about as much added sugar as 3 regular size doughnuts!     The other issue with processed foods is the amount of processed grains they contain , such as white " flour.    This is also something you want to try to limit in your diet.     But, grain products are very important for your nutrition.    Whole grains are better for your body.     Cutting back on white breads, traditional pasta, baked goods, white rice,  and processed cereals will be healthier for you.   The better choices include whole grain breads,  whole wheat pasta,  brown rice, quinoa, barley, steel cut  or rolled oats.   If you eat cereal for breakfast, try to look for one made with whole grains and less sugar.   There are many people who have a problem with gluten, for a large variety of reasons.    Generally,  products made with wheat flour , barley or rye are the primary source of gluten.       Cutting back on saturated fats is important.    You want the majority of the meat that you eat to be chicken, fish or turkey.   Baked or broiled is best -  fried adds too much fat.    There are healthy fats that are important - fat is important for holding down appetite, vitamin absorption and several metabolic processes in the body.  Monounsaturated fats raise HDL (good cholesterol) and lower LDL (bad cholesterol).   Olive oil, peanut oil, nuts, seeds, and avocados are great sources of the good fats.       Ideas are:   Trade sour cream dip for hummus (which is rich in olive oil) or guacamole; use veggies or whole-wheat chips to dip.    Nuts are an excellent source of protein and healthy fats.   Tree nuts are the best kind, such as almonds or walnuts.   Just be careful - they are high in calories, so stick to a serving size.  (Most are about 200 calories for a 1/4 cup)      Proteins are very important for your body, and they also hold down your appetite.   Try to have protein with every meal.    These generally are meats, nuts, many beans, legumes, eggs, and dairy.   You will find protein in whole grain products and some green vegetables have a little too.     When you have dairy (if you can - many people are  "lactose intolerant) try to make it low fat.    Ideas are 1% milk, lowfat yogurt or cheeses, low fat cottage cheese.   I don't generally recommend FAT FREE because they often contain artificial products to improve taste, and the fat helps hold down your appetite.   If you are lactose intolerant, try to see if taking Lactaid before having dairy helps.      Fresh fruits and vegetables are VERY important.  The brighter the better.   Many vegetables are considered \"Free Foods\" - meaning you can eat as much as you want, and it does not matter.  These include tomatoes, cucumbers, celery, peppers, all the various lettuces and kale - to name a few.   Potatoes, corn and peas are starchy, so do have more calories, but are still healthy - you just want to watch the amount of them you eat.       Fruits are full of wonderful nutrition.   They contain natural sugar called fructose, so eating them in moderation is best.   Diabetics may need to pay careful attention to how their body reacts to the sugar.  Some fruits might drastically increase their blood sugar.      Eating smaller meals with a couple of small snacks is better for your metabolism than not eating for long amounts of time  (breakfast is very important).   Trying to avoid large meals is helpful too.    Eating like this helps keep your appetite down and keeps you in burning metabolism rather than storage metabolism so your body will use the calories you eat.       I do not tell people to stop eating sweets or snack foods - just limit the amounts you have.  The less the better.   Pay attention to serving sizes, and treat them as a treat.        Foods like doughnuts, pop tarts, sugar cereals, cookies  ARE NOT GOOD FOR BREAKFAST.   They are loaded with sugar and will cause you to be hungrier in the day and often not feel well.    Caffeine needs to be limited - no more than 2 servings a day.  Some people can't have any at all.    (if you have any sleep or anxiety issues - " stop the caffeine)   Coffee, many teas, many sodas, energy drinks, almost any diet supplement,  and chocolate all contain caffeine.      Water is important.   For most people, 8   x  8 ounces  a day are needed.  This may vary for some health issues.    If you need to be on a low sodium diet, that means looking at labels and eating only 1000 - 2000 mg of sodium a day.    Calcium intake is important.  3 servings of a high calcium food or drink a day is recommended.   This is usually a cup of milk, a cup of yogurt, an ounce of a hard cheese or 1.5 ounces of a soft cheese are the usual servings.   There are other high calcium foods - including soy or almond milk, broccoli,  almonds, dark green leafy vegetable.   Make sure you are not getting more than 1000  - 1200 mg of total calcium a day (unless you have been told you need more by a doctor).    Vitamin D 3 is important to absorb the calcium and for your immune system.   For children, 400 IU a day is recommended.   For adults - 800 - 5000 IU a day  is recommended.  (Often the amount needed is individualized for adults - be sure to ask how much is right for you)    Physical activity is very important for good health.    Finding activities that give you regular exercise is very important for good health.  Try to find exercise you enjoy doing on a regular basis.    30 minutes at least 5 days a week of a good cardiovascular exercise is recommended.   That means something that gets your heart rate going faster than your usual baseline and you can find yourself breathing harder than usual while you are exercising.  If you have not done any exercise in a long time,  make sure you ask if its safe for you to start,  and be sure to gradually work up to your goal.      If you need to lose weight,  following these recommendations will help you.   And if you are doing all of this and still not losing weight, then its likely just the amount of food you are eating.   Learn to cut  back on portion sizes.  Using smaller plates may help.  Healthy weight loss is  only about a pound a week.   You have to remember that whatever you do to lose the weight, you must be prepared to keep it up for life for the weight to stay off.     A lot of people have a lot of luck with using something like a fit bit,  or a program where you keep track of all of your calories that you eat and what you burn off in the day.        You may want to use an activity/calorie tracker to help with weight loss.   There are several brands out to purchase - finding one that has a podometer to count steps,  allows you to enter exercise you have done each day, calculates your energy/calories burned and lets you enter calories taken in are the features that seem to help the most.    I suggest wearing it for a week when you get it and find out how many steps you are already getting on average, and fine out how many calories you currently burn on average a day, and keep track of everything you eat and drink in a day to find out how many calories you are currently taking in.     Then, after that week - you will have a better idea of the goals you want to set.    I recommend setting your steps goal slowly higher and higher to eventually get  to at least 7000 steps a day.     I recommend setting your calorie goal to 300 - 500 less than what you burn in the day.    Remember, eating too little causes your body to store fat.  Eating small amounts of food throughout the day helps your metabolism process food better.   Generally speaking  -  3 small meals and 2 small snacks is better than saving all your calories for 1 -2  large meals a day.      Healthy weight loss is no more than 1 - 2 pounds a week, bet even if you only lose 1 - 2 pounds  a month you are on the right  track.    You are using this method to create new habits  you will need to stick to for the rest of your life - THIS IS NOT A DIET.       You need to be working on eating as  healthy as you can too -   lean meats  (chicken, fish and turkey are best), lean dairy (if you can have dairy) such as lowfat yogurts and cheeses,   plenty of fresh fruits and vegetables, whole grains for at least half the grain foods you eat daily  (whole wheat breads, pasta and brown rice are examples), plenty of proteins in nuts, beans, lentils.        You may want to use an activity/calorie tracker to help with weight loss.   There are several brands out to purchase - finding one that has a podometer to count steps,  allows you to enter exercise you have done each day, calculates your energy/calories burned and lets you enter calories taken in are the features that seem to help the most.    I suggest wearing it for a week when you get it and find out how many steps you are already getting on average, and fine out how many calories you currently burn on average a day, and keep track of everything you eat and drink in a day to find out how many calories you are currently taking in.     Then, after that week - you will have a better idea of the goals you want to set.    I recommend setting your steps goal slowly higher and higher to eventually get  to at least 7000 steps a day.     I recommend setting your calorie goal to 300 - 500 less than what you burn in the day.    Remember, eating too little causes your body to store fat.  Eating small amounts of food throughout the day helps your metabolism process food better.   Generally speaking  -  3 small meals and 2 small snacks is better than saving all your calories for 1 -2  large meals a day.      Healthy weight loss is no more than 1 - 2 pounds a week, bet even if you only lose 1 - 2 pounds  a month you are on the right  track.    You are using this method to create new habits  you will need to stick to for the rest of your life - THIS IS NOT A DIET.       You need to be working on eating as healthy as you can too -   lean meats  (chicken, fish and turkey are  best), lean dairy (if you can have dairy) such as lowfat yogurts and cheeses,   plenty of fresh fruits and vegetables, whole grains for at least half the grain foods you eat daily  (whole wheat breads, pasta and brown rice are examples), plenty of proteins in nuts, beans, lentils.

## 2025-04-17 ENCOUNTER — APPOINTMENT (OUTPATIENT)
Dept: GASTROENTEROLOGY | Facility: CLINIC | Age: 55
End: 2025-04-17
Payer: COMMERCIAL

## 2025-07-17 ENCOUNTER — APPOINTMENT (OUTPATIENT)
Dept: GASTROENTEROLOGY | Facility: CLINIC | Age: 55
End: 2025-07-17
Payer: COMMERCIAL

## 2025-08-20 DIAGNOSIS — C18.9 MALIGNANT NEOPLASM OF COLON, UNSPECIFIED PART OF COLON (MULTI): ICD-10-CM

## 2025-09-02 LAB
NTRA SIGNATERA MTM READOUT: 0 MTM/ML
NTRA SIGNATERA TEST RESULT: NEGATIVE

## 2025-09-04 ENCOUNTER — APPOINTMENT (OUTPATIENT)
Dept: PRIMARY CARE | Facility: CLINIC | Age: 55
End: 2025-09-04
Payer: COMMERCIAL

## 2025-10-23 ENCOUNTER — APPOINTMENT (OUTPATIENT)
Dept: GASTROENTEROLOGY | Facility: CLINIC | Age: 55
End: 2025-10-23
Payer: COMMERCIAL

## 2025-11-24 ENCOUNTER — APPOINTMENT (OUTPATIENT)
Dept: PRIMARY CARE | Facility: CLINIC | Age: 55
End: 2025-11-24
Payer: COMMERCIAL